# Patient Record
Sex: MALE | Race: WHITE | URBAN - NONMETROPOLITAN AREA
[De-identification: names, ages, dates, MRNs, and addresses within clinical notes are randomized per-mention and may not be internally consistent; named-entity substitution may affect disease eponyms.]

---

## 2023-08-01 ENCOUNTER — OFFICE VISIT (OUTPATIENT)
Dept: URBAN - NONMETROPOLITAN AREA CLINIC 19 | Facility: CLINIC | Age: 58
End: 2023-08-01

## 2023-08-01 DIAGNOSIS — H59.023 CATARACT FRAGMENT IN BILATERAL EYE FOLLOWING CATARACT SURGERY: Primary | ICD-10-CM

## 2023-08-01 DIAGNOSIS — H26.493 OTHER SECONDARY CATARACT, BILATERAL: ICD-10-CM

## 2023-08-01 PROCEDURE — 99202 OFFICE O/P NEW SF 15 MIN: CPT | Performed by: OPHTHALMOLOGY

## 2023-08-01 ASSESSMENT — VISUAL ACUITY
OS: 20/20
OD: 20/30

## 2023-08-01 ASSESSMENT — INTRAOCULAR PRESSURE
OD: 10
OS: 8

## 2023-08-16 ENCOUNTER — OFFICE VISIT (OUTPATIENT)
Dept: URBAN - METROPOLITAN AREA CLINIC 91 | Facility: CLINIC | Age: 58
End: 2023-08-16

## 2023-08-16 DIAGNOSIS — H26.492 OTHER SECONDARY CATARACT, LEFT EYE: ICD-10-CM

## 2023-08-16 DIAGNOSIS — H04.123 DRY EYE SYNDROME OF BILATERAL LACRIMAL GLANDS: Primary | ICD-10-CM

## 2023-08-16 DIAGNOSIS — H26.493 OTHER SECONDARY CATARACT, BILATERAL: ICD-10-CM

## 2023-08-16 PROCEDURE — 99213 OFFICE O/P EST LOW 20 MIN: CPT | Performed by: SPECIALIST

## 2023-08-16 PROCEDURE — 66821 AFTER CATARACT LASER SURGERY: CPT | Performed by: SPECIALIST

## 2023-08-16 ASSESSMENT — VISUAL ACUITY
OS: 20/30
OD: 20/20

## 2023-08-16 ASSESSMENT — INTRAOCULAR PRESSURE
OD: 9
OS: 7

## 2023-08-30 ENCOUNTER — OFFICE VISIT (OUTPATIENT)
Dept: URBAN - METROPOLITAN AREA CLINIC 91 | Facility: CLINIC | Age: 58
End: 2023-08-30

## 2023-08-30 DIAGNOSIS — H52.03 HYPERMETROPIA, BILATERAL: ICD-10-CM

## 2023-08-30 DIAGNOSIS — Z48.810 ENCOUNTER FOR SURGICAL AFTERCARE FOLLOWING SURGERY ON A SENSE ORGAN: Primary | ICD-10-CM

## 2023-08-30 PROCEDURE — 99024 POSTOP FOLLOW-UP VISIT: CPT | Performed by: SPECIALIST

## 2023-08-30 ASSESSMENT — VISUAL ACUITY
OD: 20/20
OS: 20/20

## 2023-08-30 ASSESSMENT — INTRAOCULAR PRESSURE
OS: 9
OD: 9

## 2024-05-22 ENCOUNTER — OFFICE VISIT (OUTPATIENT)
Dept: URBAN - METROPOLITAN AREA CLINIC 91 | Facility: CLINIC | Age: 59
End: 2024-05-22

## 2024-05-22 DIAGNOSIS — H04.123 DRY EYE SYNDROME OF BILATERAL LACRIMAL GLANDS: ICD-10-CM

## 2024-05-22 DIAGNOSIS — T85.22XA DISPLACEMENT OF INTRAOCULAR LENS, INITIAL ENCOUNTER: Primary | ICD-10-CM

## 2024-05-22 PROCEDURE — 99213 OFFICE O/P EST LOW 20 MIN: CPT | Performed by: SPECIALIST

## 2024-05-22 ASSESSMENT — INTRAOCULAR PRESSURE
OS: 10
OD: 9

## 2024-05-22 ASSESSMENT — VISUAL ACUITY
OD: 20/20
OS: 20/25

## 2024-07-18 ENCOUNTER — APPOINTMENT (RX ONLY)
Dept: URBAN - NONMETROPOLITAN AREA CLINIC 4 | Facility: CLINIC | Age: 59
Setting detail: DERMATOLOGY
End: 2024-07-18

## 2024-07-18 DIAGNOSIS — L72.8 OTHER FOLLICULAR CYSTS OF THE SKIN AND SUBCUTANEOUS TISSUE: ICD-10-CM

## 2024-07-18 DIAGNOSIS — L738 OTHER SPECIFIED DISEASES OF HAIR AND HAIR FOLLICLES: ICD-10-CM

## 2024-07-18 DIAGNOSIS — L82.0 INFLAMED SEBORRHEIC KERATOSIS: ICD-10-CM

## 2024-07-18 DIAGNOSIS — D485 NEOPLASM OF UNCERTAIN BEHAVIOR OF SKIN: ICD-10-CM

## 2024-07-18 DIAGNOSIS — D18.0 HEMANGIOMA: ICD-10-CM

## 2024-07-18 DIAGNOSIS — L663 OTHER SPECIFIED DISEASES OF HAIR AND HAIR FOLLICLES: ICD-10-CM

## 2024-07-18 DIAGNOSIS — L81.4 OTHER MELANIN HYPERPIGMENTATION: ICD-10-CM

## 2024-07-18 DIAGNOSIS — L73.9 FOLLICULAR DISORDER, UNSPECIFIED: ICD-10-CM

## 2024-07-18 PROBLEM — D48.5 NEOPLASM OF UNCERTAIN BEHAVIOR OF SKIN: Status: ACTIVE | Noted: 2024-07-18

## 2024-07-18 PROBLEM — D18.01 HEMANGIOMA OF SKIN AND SUBCUTANEOUS TISSUE: Status: ACTIVE | Noted: 2024-07-18

## 2024-07-18 PROBLEM — L02.222 FURUNCLE OF BACK [ANY PART, EXCEPT BUTTOCK]: Status: ACTIVE | Noted: 2024-07-18

## 2024-07-18 PROCEDURE — ? SHAVE REMOVAL

## 2024-07-18 PROCEDURE — 99203 OFFICE O/P NEW LOW 30 MIN: CPT | Mod: 25

## 2024-07-18 PROCEDURE — 11311 SHAVE SKIN LESION 0.6-1.0 CM: CPT

## 2024-07-18 PROCEDURE — ? CONSULTATION EXCISION

## 2024-07-18 PROCEDURE — ? COUNSELING

## 2024-07-18 ASSESSMENT — LOCATION SIMPLE DESCRIPTION DERM
LOCATION SIMPLE: UPPER BACK
LOCATION SIMPLE: SCALP
LOCATION SIMPLE: CHEST
LOCATION SIMPLE: LEFT CHEEK
LOCATION SIMPLE: LEFT FOREARM
LOCATION SIMPLE: LEFT UPPER BACK

## 2024-07-18 ASSESSMENT — LOCATION DETAILED DESCRIPTION DERM
LOCATION DETAILED: LEFT MID-UPPER BACK
LOCATION DETAILED: LEFT LATERAL INFERIOR CHEST
LOCATION DETAILED: LEFT CENTRAL FRONTAL SCALP
LOCATION DETAILED: SUPERIOR THORACIC SPINE
LOCATION DETAILED: LEFT PROXIMAL DORSAL FOREARM
LOCATION DETAILED: LEFT SUPERIOR PREAURICULAR CHEEK

## 2024-07-18 ASSESSMENT — LOCATION ZONE DERM
LOCATION ZONE: SCALP
LOCATION ZONE: FACE
LOCATION ZONE: ARM
LOCATION ZONE: TRUNK

## 2024-07-18 NOTE — PROCEDURE: CONSULTATION EXCISION
Anatomic Location From Referring Provider: right posterior temporal scalp
Detail Level: Detailed
X Size Of Lesion In Cm (Optional): 0

## 2024-07-18 NOTE — PROCEDURE: SHAVE REMOVAL
Medical Necessity Information: It is in your best interest to select a reason for this procedure from the list below. All of these items fulfill various CMS LCD requirements except the new and changing color options.
Medical Necessity Clause: This procedure was medically necessary because the lesion that was treated was:
Lab: -[id number]
Lab Facility: 0
Body Location Override (Optional - Billing Will Still Be Based On Selected Body Map Location If Applicable): left inferior preauricular/lateral jaw
Detail Level: Detailed
Was A Bandage Applied: Yes
Size Of Lesion In Cm (Required): 0.8
Depth Of Shave: dermis
Biopsy Method: Dermablade
Anesthesia Type: 1% lidocaine with epinephrine
Hemostasis: Electrocautery
Wound Care: Petrolatum
Path Notes (To The Dermatopathologist): Size: 0.8 cm R/O: DN vs MM
Render Path Notes In Note?: No
Consent was obtained from the patient. The risks and benefits to therapy were discussed in detail. Specifically, the risks of infection, scarring, bleeding, prolonged wound healing, incomplete removal, allergy to anesthesia, nerve injury and recurrence were addressed. Prior to the procedure, the treatment site was clearly identified and confirmed by the patient. All components of Universal Protocol/PAUSE Rule completed.
Post-Care Instructions: I reviewed with the patient in detail post-care instructions. Patient is to keep the biopsy site dry overnight, and then apply bacitracin twice daily until healed. Patient may apply hydrogen peroxide soaks to remove any crusting.
Notification Instructions: Patient will be notified of pathology results. However, patient instructed to call the office if not contacted within 2 weeks.
Billing Type: Third-Party Bill

## 2024-08-01 ENCOUNTER — APPOINTMENT (RX ONLY)
Dept: URBAN - NONMETROPOLITAN AREA CLINIC 4 | Facility: CLINIC | Age: 59
Setting detail: DERMATOLOGY
End: 2024-08-01

## 2024-08-01 PROBLEM — D03.39 MELANOMA IN SITU OF OTHER PARTS OF FACE: Status: ACTIVE | Noted: 2024-08-01

## 2024-08-01 PROCEDURE — 14041 TIS TRNFR F/C/C/M/N/A/G/H/F: CPT

## 2024-08-01 PROCEDURE — ? PATHOLOGY DISCUSSION

## 2024-08-01 PROCEDURE — 14040 TIS TRNFR F/C/C/M/N/A/G/H/F: CPT | Mod: 59

## 2024-08-01 PROCEDURE — ? COUNSELING

## 2024-08-01 PROCEDURE — ? EXCISION

## 2024-08-01 PROCEDURE — ? CONSULTATION EXCISION

## 2024-08-15 ENCOUNTER — APPOINTMENT (RX ONLY)
Dept: URBAN - NONMETROPOLITAN AREA CLINIC 4 | Facility: CLINIC | Age: 59
Setting detail: DERMATOLOGY
End: 2024-08-15

## 2024-08-15 DIAGNOSIS — D485 NEOPLASM OF UNCERTAIN BEHAVIOR OF SKIN: ICD-10-CM

## 2024-08-15 DIAGNOSIS — L08.9 LOCAL INFECTION OF THE SKIN AND SUBCUTANEOUS TISSUE, UNSPECIFIED: ICD-10-CM

## 2024-08-15 DIAGNOSIS — Z48.02 ENCOUNTER FOR REMOVAL OF SUTURES: ICD-10-CM

## 2024-08-15 PROBLEM — D48.5 NEOPLASM OF UNCERTAIN BEHAVIOR OF SKIN: Status: ACTIVE | Noted: 2024-08-15

## 2024-08-15 PROCEDURE — 99212 OFFICE O/P EST SF 10 MIN: CPT | Mod: 24,25

## 2024-08-15 PROCEDURE — ? TREATMENT REGIMEN

## 2024-08-15 PROCEDURE — ? PRESCRIPTION

## 2024-08-15 PROCEDURE — 13121 CMPLX RPR S/A/L 2.6-7.5 CM: CPT | Mod: 79

## 2024-08-15 PROCEDURE — ? EXCISION

## 2024-08-15 PROCEDURE — 11426 EXC H-F-NK-SP B9+MARG >4 CM: CPT | Mod: 79

## 2024-08-15 PROCEDURE — ? SUTURE REMOVAL (GLOBAL PERIOD)

## 2024-08-15 PROCEDURE — ? COUNSELING

## 2024-08-15 RX ORDER — CEPHALEXIN 500 MG/1
CAPSULE ORAL
Qty: 30 | Refills: 0 | Status: ERX | COMMUNITY
Start: 2024-08-15

## 2024-08-15 RX ADMIN — CEPHALEXIN: 500 CAPSULE ORAL at 00:00

## 2024-08-15 ASSESSMENT — LOCATION SIMPLE DESCRIPTION DERM
LOCATION SIMPLE: LEFT CHEEK
LOCATION SIMPLE: POSTERIOR SCALP

## 2024-08-15 ASSESSMENT — LOCATION DETAILED DESCRIPTION DERM
LOCATION DETAILED: RIGHT OCCIPITAL SCALP
LOCATION DETAILED: LEFT SUPERIOR PREAURICULAR CHEEK

## 2024-08-15 ASSESSMENT — LOCATION ZONE DERM
LOCATION ZONE: FACE
LOCATION ZONE: SCALP

## 2024-08-21 ENCOUNTER — APPOINTMENT (OUTPATIENT)
Dept: ADMISSIONS | Facility: MEDICAL CENTER | Age: 59
End: 2024-08-21
Attending: OPHTHALMOLOGY
Payer: COMMERCIAL

## 2024-08-22 ENCOUNTER — PRE-ADMISSION TESTING (OUTPATIENT)
Dept: ADMISSIONS | Facility: MEDICAL CENTER | Age: 59
End: 2024-08-22
Attending: OPHTHALMOLOGY
Payer: COMMERCIAL

## 2024-08-22 NOTE — OR NURSING
During patient's pre-admit telephone appointment today pt. stated he [place] and is unable to have testing done prior to DOS on 8/30/2024 with MD Long. Informed MD Long office of the above, spoke with his surgery scheduler Zayda who states she will inform MD Long.

## 2024-08-22 NOTE — OR NURSING
[facility] [place]  's office aware.  Orders faxed.  [facility] called to let us know that the print on the orders is too light and they cannot read the orders.  @[phone] Call made to Dr. Long's office to notify.  Spoke with Hope, the surgery scheduler, asked her to fax original orders to [facility] and provided her with the fax number.  She reports it is okay to complete EKG day of surgery.

## 2024-08-27 NOTE — OR NURSING
Patient was to complete preop testing at [facility] in [place]. As of today, no results.  @[phone] Call made to patient to inquire.  No answer and mailbox full so I was unable to leave a message.

## 2024-08-29 ENCOUNTER — APPOINTMENT (RX ONLY)
Dept: URBAN - NONMETROPOLITAN AREA CLINIC 4 | Facility: CLINIC | Age: 59
Setting detail: DERMATOLOGY
End: 2024-08-29

## 2024-08-29 DIAGNOSIS — Z48.02 ENCOUNTER FOR REMOVAL OF SUTURES: ICD-10-CM

## 2024-08-29 PROCEDURE — ? SUTURE REMOVAL (GLOBAL PERIOD)

## 2024-08-29 PROCEDURE — ? PATHOLOGY DISCUSSION

## 2024-08-29 PROCEDURE — ? COUNSELING

## 2024-08-29 ASSESSMENT — LOCATION ZONE DERM: LOCATION ZONE: SCALP

## 2024-08-29 ASSESSMENT — LOCATION SIMPLE DESCRIPTION DERM: LOCATION SIMPLE: POSTERIOR SCALP

## 2024-08-29 ASSESSMENT — LOCATION DETAILED DESCRIPTION DERM: LOCATION DETAILED: RIGHT POSTAURICULAR SKIN

## 2024-08-30 ENCOUNTER — HOSPITAL ENCOUNTER (OUTPATIENT)
Facility: MEDICAL CENTER | Age: 59
End: 2024-08-30
Attending: OPHTHALMOLOGY | Admitting: OPHTHALMOLOGY
Payer: COMMERCIAL

## 2024-08-30 ENCOUNTER — ANESTHESIA (OUTPATIENT)
Dept: SURGERY | Facility: MEDICAL CENTER | Age: 59
End: 2024-08-30
Payer: COMMERCIAL

## 2024-08-30 ENCOUNTER — ANESTHESIA EVENT (OUTPATIENT)
Dept: SURGERY | Facility: MEDICAL CENTER | Age: 59
End: 2024-08-30
Payer: COMMERCIAL

## 2024-08-30 VITALS
DIASTOLIC BLOOD PRESSURE: 65 MMHG | OXYGEN SATURATION: 93 % | HEART RATE: 76 BPM | SYSTOLIC BLOOD PRESSURE: 116 MMHG | TEMPERATURE: 98.5 F | BODY MASS INDEX: 40.43 KG/M2 | WEIGHT: 315 LBS | HEIGHT: 74 IN | RESPIRATION RATE: 17 BRPM

## 2024-08-30 LAB
EKG IMPRESSION: NORMAL
ERYTHROCYTE [DISTWIDTH] IN BLOOD BY AUTOMATED COUNT: 40.7 FL (ref 35.9–50)
HCT VFR BLD AUTO: 48.6 % (ref 42–52)
HGB BLD-MCNC: 16.4 G/DL (ref 14–18)
MCH RBC QN AUTO: 29.4 PG (ref 27–33)
MCHC RBC AUTO-ENTMCNC: 33.7 G/DL (ref 32.3–36.5)
MCV RBC AUTO: 87.1 FL (ref 81.4–97.8)
PLATELET # BLD AUTO: 151 K/UL (ref 164–446)
PMV BLD AUTO: 10.6 FL (ref 9–12.9)
RBC # BLD AUTO: 5.58 M/UL (ref 4.7–6.1)
WBC # BLD AUTO: 6.4 K/UL (ref 4.8–10.8)

## 2024-08-30 PROCEDURE — 160041 HCHG SURGERY MINUTES - EA ADDL 1 MIN LEVEL 4: Performed by: OPHTHALMOLOGY

## 2024-08-30 PROCEDURE — 700101 HCHG RX REV CODE 250: Performed by: ANESTHESIOLOGY

## 2024-08-30 PROCEDURE — 700101 HCHG RX REV CODE 250

## 2024-08-30 PROCEDURE — 700101 HCHG RX REV CODE 250: Performed by: OPHTHALMOLOGY

## 2024-08-30 PROCEDURE — 700111 HCHG RX REV CODE 636 W/ 250 OVERRIDE (IP): Performed by: OPHTHALMOLOGY

## 2024-08-30 PROCEDURE — 160046 HCHG PACU - 1ST 60 MINS PHASE II: Performed by: OPHTHALMOLOGY

## 2024-08-30 PROCEDURE — 93005 ELECTROCARDIOGRAM TRACING: CPT | Performed by: OPHTHALMOLOGY

## 2024-08-30 PROCEDURE — 36415 COLL VENOUS BLD VENIPUNCTURE: CPT

## 2024-08-30 PROCEDURE — 160025 RECOVERY II MINUTES (STATS): Performed by: OPHTHALMOLOGY

## 2024-08-30 PROCEDURE — 700111 HCHG RX REV CODE 636 W/ 250 OVERRIDE (IP): Performed by: ANESTHESIOLOGY

## 2024-08-30 PROCEDURE — 160002 HCHG RECOVERY MINUTES (STAT): Performed by: OPHTHALMOLOGY

## 2024-08-30 PROCEDURE — 160029 HCHG SURGERY MINUTES - 1ST 30 MINS LEVEL 4: Performed by: OPHTHALMOLOGY

## 2024-08-30 PROCEDURE — 85027 COMPLETE CBC AUTOMATED: CPT

## 2024-08-30 PROCEDURE — 160048 HCHG OR STATISTICAL LEVEL 1-5: Performed by: OPHTHALMOLOGY

## 2024-08-30 PROCEDURE — 93010 ELECTROCARDIOGRAM REPORT: CPT | Performed by: INTERNAL MEDICINE

## 2024-08-30 PROCEDURE — 700105 HCHG RX REV CODE 258: Performed by: OPHTHALMOLOGY

## 2024-08-30 PROCEDURE — 160009 HCHG ANES TIME/MIN: Performed by: OPHTHALMOLOGY

## 2024-08-30 PROCEDURE — 160035 HCHG PACU - 1ST 60 MINS PHASE I: Performed by: OPHTHALMOLOGY

## 2024-08-30 RX ORDER — DEXAMETHASONE SODIUM PHOSPHATE 4 MG/ML
INJECTION, SOLUTION INTRA-ARTICULAR; INTRALESIONAL; INTRAMUSCULAR; INTRAVENOUS; SOFT TISSUE
Status: DISCONTINUED | OUTPATIENT
Start: 2024-08-30 | End: 2024-08-30 | Stop reason: HOSPADM

## 2024-08-30 RX ORDER — BALANCED SALT SOLUTION ENRICHED WITH BICARBONATE, DEXTROSE, AND GLUTATHIONE
KIT INTRAOCULAR
Status: DISCONTINUED
Start: 2024-08-30 | End: 2024-08-30 | Stop reason: HOSPADM

## 2024-08-30 RX ORDER — HYDROMORPHONE HYDROCHLORIDE 1 MG/ML
0.2 INJECTION, SOLUTION INTRAMUSCULAR; INTRAVENOUS; SUBCUTANEOUS
Status: DISCONTINUED | OUTPATIENT
Start: 2024-08-30 | End: 2024-08-30 | Stop reason: HOSPADM

## 2024-08-30 RX ORDER — EPHEDRINE SULFATE 50 MG/ML
5 INJECTION, SOLUTION INTRAVENOUS
Status: DISCONTINUED | OUTPATIENT
Start: 2024-08-30 | End: 2024-08-30 | Stop reason: HOSPADM

## 2024-08-30 RX ORDER — KETOROLAC TROMETHAMINE 15 MG/ML
INJECTION, SOLUTION INTRAMUSCULAR; INTRAVENOUS PRN
Status: DISCONTINUED | OUTPATIENT
Start: 2024-08-30 | End: 2024-08-30 | Stop reason: SURG

## 2024-08-30 RX ORDER — CYCLOPENTOLATE HYDROCHLORIDE 10 MG/ML
SOLUTION/ DROPS OPHTHALMIC
Status: COMPLETED
Start: 2024-08-30 | End: 2024-08-30

## 2024-08-30 RX ORDER — MEPERIDINE HYDROCHLORIDE 25 MG/ML
6.25 INJECTION INTRAMUSCULAR; INTRAVENOUS; SUBCUTANEOUS
Status: DISCONTINUED | OUTPATIENT
Start: 2024-08-30 | End: 2024-08-30 | Stop reason: HOSPADM

## 2024-08-30 RX ORDER — TRIAMCINOLONE ACETONIDE 40 MG/ML
INJECTION, SUSPENSION INTRA-ARTICULAR; INTRAMUSCULAR
Status: DISCONTINUED | OUTPATIENT
Start: 2024-08-30 | End: 2024-08-30 | Stop reason: HOSPADM

## 2024-08-30 RX ORDER — OXYCODONE HCL 5 MG/5 ML
10 SOLUTION, ORAL ORAL
Status: DISCONTINUED | OUTPATIENT
Start: 2024-08-30 | End: 2024-08-30 | Stop reason: HOSPADM

## 2024-08-30 RX ORDER — NEOMYCIN SULFATE, POLYMYXIN B SULFATE, AND DEXAMETHASONE 3.5; 10000; 1 MG/G; [USP'U]/G; MG/G
OINTMENT OPHTHALMIC
Status: DISCONTINUED | OUTPATIENT
Start: 2024-08-30 | End: 2024-08-30 | Stop reason: HOSPADM

## 2024-08-30 RX ORDER — PHENYLEPHRINE HCL IN 0.9% NACL 1 MG/10 ML
SYRINGE (ML) INTRAVENOUS PRN
Status: DISCONTINUED | OUTPATIENT
Start: 2024-08-30 | End: 2024-08-30 | Stop reason: SURG

## 2024-08-30 RX ORDER — SODIUM CHLORIDE, SODIUM LACTATE, POTASSIUM CHLORIDE, CALCIUM CHLORIDE 600; 310; 30; 20 MG/100ML; MG/100ML; MG/100ML; MG/100ML
INJECTION, SOLUTION INTRAVENOUS CONTINUOUS
Status: DISCONTINUED | OUTPATIENT
Start: 2024-08-30 | End: 2024-08-30 | Stop reason: HOSPADM

## 2024-08-30 RX ORDER — LIDOCAINE HYDROCHLORIDE 20 MG/ML
INJECTION, SOLUTION EPIDURAL; INFILTRATION; INTRACAUDAL; PERINEURAL PRN
Status: DISCONTINUED | OUTPATIENT
Start: 2024-08-30 | End: 2024-08-30 | Stop reason: SURG

## 2024-08-30 RX ORDER — HYDROMORPHONE HYDROCHLORIDE 1 MG/ML
0.1 INJECTION, SOLUTION INTRAMUSCULAR; INTRAVENOUS; SUBCUTANEOUS
Status: DISCONTINUED | OUTPATIENT
Start: 2024-08-30 | End: 2024-08-30 | Stop reason: HOSPADM

## 2024-08-30 RX ORDER — CEFAZOLIN SODIUM 1 G/3ML
INJECTION, POWDER, FOR SOLUTION INTRAMUSCULAR; INTRAVENOUS
Status: DISCONTINUED | OUTPATIENT
Start: 2024-08-30 | End: 2024-08-30 | Stop reason: HOSPADM

## 2024-08-30 RX ORDER — ONDANSETRON 2 MG/ML
INJECTION INTRAMUSCULAR; INTRAVENOUS PRN
Status: DISCONTINUED | OUTPATIENT
Start: 2024-08-30 | End: 2024-08-30 | Stop reason: SURG

## 2024-08-30 RX ORDER — TIMOLOL MALEATE 5 MG/ML
SOLUTION/ DROPS OPHTHALMIC
Status: DISCONTINUED | OUTPATIENT
Start: 2024-08-30 | End: 2024-08-30 | Stop reason: HOSPADM

## 2024-08-30 RX ORDER — DIPHENHYDRAMINE HYDROCHLORIDE 50 MG/ML
12.5 INJECTION INTRAMUSCULAR; INTRAVENOUS
Status: DISCONTINUED | OUTPATIENT
Start: 2024-08-30 | End: 2024-08-30 | Stop reason: HOSPADM

## 2024-08-30 RX ORDER — BALANCED SALT SOLUTION ENRICHED WITH BICARBONATE, DEXTROSE, AND GLUTATHIONE
KIT INTRAOCULAR
Status: DISCONTINUED | OUTPATIENT
Start: 2024-08-30 | End: 2024-08-30 | Stop reason: HOSPADM

## 2024-08-30 RX ORDER — SODIUM CHLORIDE, SODIUM LACTATE, POTASSIUM CHLORIDE, CALCIUM CHLORIDE 600; 310; 30; 20 MG/100ML; MG/100ML; MG/100ML; MG/100ML
INJECTION, SOLUTION INTRAVENOUS CONTINUOUS
Status: DISCONTINUED | OUTPATIENT
Start: 2024-08-30 | End: 2024-08-30

## 2024-08-30 RX ORDER — BALANCED SALT SOLUTION 6.4; .75; .48; .3; 3.9; 1.7 MG/ML; MG/ML; MG/ML; MG/ML; MG/ML; MG/ML
SOLUTION OPHTHALMIC
Status: DISCONTINUED | OUTPATIENT
Start: 2024-08-30 | End: 2024-08-30 | Stop reason: HOSPADM

## 2024-08-30 RX ORDER — OXYCODONE HCL 5 MG/5 ML
5 SOLUTION, ORAL ORAL
Status: DISCONTINUED | OUTPATIENT
Start: 2024-08-30 | End: 2024-08-30 | Stop reason: HOSPADM

## 2024-08-30 RX ORDER — PHENYLEPHRINE HYDROCHLORIDE 25 MG/ML
SOLUTION/ DROPS OPHTHALMIC
Status: COMPLETED
Start: 2024-08-30 | End: 2024-08-30

## 2024-08-30 RX ORDER — HYDRALAZINE HYDROCHLORIDE 20 MG/ML
5 INJECTION INTRAMUSCULAR; INTRAVENOUS
Status: DISCONTINUED | OUTPATIENT
Start: 2024-08-30 | End: 2024-08-30 | Stop reason: HOSPADM

## 2024-08-30 RX ORDER — ONDANSETRON 2 MG/ML
4 INJECTION INTRAMUSCULAR; INTRAVENOUS
Status: DISCONTINUED | OUTPATIENT
Start: 2024-08-30 | End: 2024-08-30 | Stop reason: HOSPADM

## 2024-08-30 RX ORDER — LABETALOL HYDROCHLORIDE 5 MG/ML
5 INJECTION, SOLUTION INTRAVENOUS
Status: DISCONTINUED | OUTPATIENT
Start: 2024-08-30 | End: 2024-08-30 | Stop reason: HOSPADM

## 2024-08-30 RX ORDER — HYDROMORPHONE HYDROCHLORIDE 1 MG/ML
0.4 INJECTION, SOLUTION INTRAMUSCULAR; INTRAVENOUS; SUBCUTANEOUS
Status: DISCONTINUED | OUTPATIENT
Start: 2024-08-30 | End: 2024-08-30 | Stop reason: HOSPADM

## 2024-08-30 RX ORDER — HALOPERIDOL 5 MG/ML
1 INJECTION INTRAMUSCULAR
Status: DISCONTINUED | OUTPATIENT
Start: 2024-08-30 | End: 2024-08-30 | Stop reason: HOSPADM

## 2024-08-30 RX ORDER — ATROPINE SULFATE 10 MG/ML
SOLUTION/ DROPS OPHTHALMIC
Status: DISCONTINUED | OUTPATIENT
Start: 2024-08-30 | End: 2024-08-30 | Stop reason: HOSPADM

## 2024-08-30 RX ORDER — DEXAMETHASONE SODIUM PHOSPHATE 4 MG/ML
INJECTION, SOLUTION INTRA-ARTICULAR; INTRALESIONAL; INTRAMUSCULAR; INTRAVENOUS; SOFT TISSUE PRN
Status: DISCONTINUED | OUTPATIENT
Start: 2024-08-30 | End: 2024-08-30 | Stop reason: SURG

## 2024-08-30 RX ADMIN — CYCLOPENTOLATE HYDROCHLORIDE 3 DROP: 10 SOLUTION OPHTHALMIC at 09:40

## 2024-08-30 RX ADMIN — LIDOCAINE HYDROCHLORIDE 100 MG: 20 INJECTION, SOLUTION EPIDURAL; INFILTRATION; INTRACAUDAL at 10:20

## 2024-08-30 RX ADMIN — SODIUM CHLORIDE, POTASSIUM CHLORIDE, SODIUM LACTATE AND CALCIUM CHLORIDE: 600; 310; 30; 20 INJECTION, SOLUTION INTRAVENOUS at 10:10

## 2024-08-30 RX ADMIN — Medication 200 MCG: at 11:03

## 2024-08-30 RX ADMIN — PROPOFOL 250 MG: 10 INJECTION, EMULSION INTRAVENOUS at 10:20

## 2024-08-30 RX ADMIN — Medication 200 MCG: at 11:30

## 2024-08-30 RX ADMIN — KETOROLAC TROMETHAMINE 15 MG: 15 INJECTION, SOLUTION INTRAMUSCULAR; INTRAVENOUS at 11:53

## 2024-08-30 RX ADMIN — FENTANYL CITRATE 100 MCG: 50 INJECTION, SOLUTION INTRAMUSCULAR; INTRAVENOUS at 10:19

## 2024-08-30 RX ADMIN — Medication 200 MCG: at 10:34

## 2024-08-30 RX ADMIN — ONDANSETRON 4 MG: 2 INJECTION INTRAMUSCULAR; INTRAVENOUS at 11:53

## 2024-08-30 RX ADMIN — DEXAMETHASONE SODIUM PHOSPHATE 4 MG: 4 INJECTION INTRA-ARTICULAR; INTRALESIONAL; INTRAMUSCULAR; INTRAVENOUS; SOFT TISSUE at 10:35

## 2024-08-30 RX ADMIN — PHENYLEPHRINE HYDROCHLORIDE 3 DROP: 25 SOLUTION/ DROPS OPHTHALMIC at 09:40

## 2024-08-30 RX ADMIN — FENTANYL CITRATE 50 MCG: 50 INJECTION, SOLUTION INTRAMUSCULAR; INTRAVENOUS at 11:53

## 2024-08-30 ASSESSMENT — PAIN DESCRIPTION - PAIN TYPE
TYPE: SURGICAL PAIN

## 2024-08-30 ASSESSMENT — PAIN SCALES - GENERAL: PAIN_LEVEL: 2

## 2024-08-30 NOTE — DISCHARGE INSTRUCTIONS
What to Expect Post Anesthesia    Rest and take it easy for the first 24 hours.  A responsible adult is recommended to remain with you during that time.  It is normal to feel sleepy.  We encourage you to not do anything that requires balance, judgment or coordination.    FOR 24 HOURS DO NOT:  Drive, operate machinery or run household appliances.  Drink beer or alcoholic beverages.  Make important decisions or sign legal documents.    To avoid nausea, slowly advance diet as tolerated, avoiding spicy or greasy foods for the first day.  Add more substantial food to your diet according to your provider's instructions.  Babies can be fed formula or breast milk as soon as they are hungry.  INCREASE FLUIDS AND FIBER TO AVOID CONSTIPATION.    MILD FLU-LIKE SYMPTOMS ARE NORMAL.  YOU MAY EXPERIENCE GENERALIZED MUSCLE ACHES, THROAT IRRITATION, HEADACHE AND/OR SOME NAUSEA.    Last pain medication given:  Toradol (like ibuprofen/motrin) at 11:53am, can take another dose of ibuprofen at 5:53pm

## 2024-08-30 NOTE — ANESTHESIA POSTPROCEDURE EVALUATION
Patient: Hugh Hickey    Procedure Summary       Date: 08/30/24 Room / Location: [facility] ROOM 24 / SURGERY SAME DAY [facility]    Anesthesia Start: 1015 Anesthesia Stop: 1226    Procedure: LEFT EYE POSTERIOR VITRECTOMY WITH REMOVAL OF DISLOCATED INTRAOCULAR LENS (Left: Eye) Diagnosis: (NEW DISLOCATED LENS OS)    Surgeons: Jose Miguel Long M.D. Responsible Provider: Chandrakant Leung M.D.    Anesthesia Type: general ASA Status: 3            Final Anesthesia Type: general  Last vitals  BP   Blood Pressure: 116/65    Temp   36.9 °C (98.5 °F)    Pulse   76   Resp   17    SpO2   93 %      Anesthesia Post Evaluation    Patient location during evaluation: PACU  Patient participation: complete - patient participated  Level of consciousness: awake and alert  Pain score: 2    Airway patency: patent  Anesthetic complications: no  Cardiovascular status: hemodynamically stable  Respiratory status: acceptable  Hydration status: euvolemic    PONV: none          There were no known notable events for this encounter.     Nurse Pain Score: 2 (NPRS)

## 2024-08-30 NOTE — OR NURSING
1223 - Pt to PACU 7 from OR.  Bedside report from anesthesiologist and RN.  Attached to monitoring, VSS, breathing is calm and unlabored on 6L oxymask with eye patch and dressing to L eye.     1230- Pt awake, no reports of pain or nausea. Tolerating sips of water, VSS on RA. Called and updated pt's wife.     1257- Pt's wife brought back to bedside. Reviewed discharge instructions with pt and pt's wife. All instructions acknowledged, all questions answered.     1325- Pt feeling ready for discharge, wife assisting him to get dressed.     1335- IV dc'd, pt escorted off the unit in wheelchair by CCT, VSS on RA, all belongings sent with pt's wife.

## 2024-08-30 NOTE — ANESTHESIA TIME REPORT
Anesthesia Start and Stop Event Times       Date Time Event    8/30/2024 1000 Ready for Procedure     1015 Anesthesia Start     1226 Anesthesia Stop          Responsible Staff  08/30/24      Name Role Begin End    Chandrakant Leung M.D. Anesth 1015 1226          Overtime Reason:  no overtime (within assigned shift)    Comments:

## 2024-08-30 NOTE — ANESTHESIA PROCEDURE NOTES
Airway    Date/Time: 8/30/2024 10:20 AM    Performed by: Chandrakant Leung M.D.  Authorized by: Chandrakant Leung M.D.    Location:  OR  Urgency:  Elective  Indications for Airway Management:  Anesthesia      Spontaneous Ventilation: absent    Sedation Level:  Deep  Preoxygenated: Yes    Mask Difficulty Assessment:  0 - not attempted  Final Airway Type:  Supraglottic airway  Final Supraglottic Airway:  Standard LMA    SGA Size:  5  Number of Attempts at Approach:  1  Number of Other Approaches Attempted:  0

## 2024-08-30 NOTE — ANESTHESIA PREPROCEDURE EVALUATION
Case: [id number] Anesthesia Start Date/Time: 08/30/24 1015    Procedures:       LEFT EYE POSTERIOR VITRECTOMY WITH REMOVAL POSSIBLE OF DISLOCATED INTRAOCULAR LENS IRIS FIXATION OF DISLOCATED INTRAOCULAR AND POSSIBLE REMOVAL INTRAOCULAR LENS 25 GAUGE AND ANY OTHER INDICATED PROCEDURES      EXTRACTION, CATARACT, WITH IOL INSERTION    Pre-op diagnosis: NEW DISLOCATED LENS OS    Location: [facility] ROOM 24 / SURGERY SAME DAY [facility]    Surgeons: Jose Miguel Long M.D.            Obesity, hiatal hernia.     Relevant Problems   No relevant active problems       Physical Exam    Airway   Mallampati: II  TM distance: >3 FB  Neck ROM: full       Cardiovascular - normal exam  Rhythm: regular  Rate: normal  (-) murmur     Dental - normal exam           Pulmonary - normal exam  Breath sounds clear to auscultation     Abdominal    Neurological - normal exam                   Anesthesia Plan    ASA 3   ASA physical status 3 criteria: morbid obesity - BMI greater than or equal to 40    Plan - general       Airway plan will be LMA          Induction: intravenous    Postoperative Plan: Postoperative administration of opioids is intended.    Pertinent diagnostic labs and testing reviewed    Informed Consent:    Anesthetic plan and risks discussed with patient.    Use of blood products discussed with: patient whom consented to blood products.

## 2024-08-31 NOTE — OP REPORT
DATE OF SERVICE:  08/30/2024     PREOPERATIVE DIAGNOSES:  Posteriorly dislocated lens implant, left eye and   floater, left eye.     POSTOPERATIVE DIAGNOSIS:  Posteriorly dislocated lens implant, left eye and   floater, left eye.     SURGEON:  Jose Miguel Long MD     ASSISTANT:  None.     ANESTHESIA:  General.     BLOOD LOSS:  None.     SPECIMENS REMOVED:  None.     IMPLANT:  Intraocular lens implant.     INDICATIONS:  This patient presented with the findings noted above.  We   discussed in detail the risks, benefits and alternatives regarding surgery.    Risks include but not limited to the following:  High eye pressure, low eye   pressure, bleeding, infection, retinal tear, retinal detachment, need for   further surgery, pain-induced refractive error, complete and irreversible loss   of all vision, loss of the eye.  The patient understood there were no   structural or visual guarantees.  He had a chance to have all of his questions   answered.  He consented to the procedure.     PROCEDURE IN DETAIL:  The patient was prepped and draped in the usual sterile   manner.  Attention directed toward the left eye.  The 25 gauge trocars were   placed 3.5 mm posterior to the limbus at 9:30, 2:30, and 3:30 o'clock.  The   infusion cannula was placed in the inferotemporal sclerotomy.  We then   proceeded with posterior vitrectomy.  We then brought the lens implant   anterior to the iris using 25 gauge Maxi  forceps.  This was a somewhat   complex maneuver, but we were able to succeed and bring it anterior to the   iris after several attempts.  We did note in the beginning of the case, the   patient did have a fair amount of transillumination defects and once the lens   was brought to the anterior chamber, it was actually being buffeted about   quite a bit from the intraocular recurrence from the infusion cannula even   though we had dropped infusion down to 5.  I should note that also it is part   of the procedure,  bringing the lens into the anterior chamber, we did make 2   paracentesis with a Superblade at the supratemporal supranasal quadrants.    Also at this point, the pupil on its own come down to about 3 mm in diameter.    Thus, we did not need to use Miochol at this point.  We then attempted to   place the haptics posterior to the iris while keeping the optic captured   anterior to the iris.  However, the lens did actually slip back behind the   iris in its entirety and fell to the back of the retina again.  We picked it   up with forceps and then in a similar maneuver too before, we tried to bring   it anterior to the iris.  However, in the middle of the hand shake maneuver,   we switched from grasping the haptic from the right hand to the left hand, the   haptic, which was already seen to be quite fragile, did in fact break, so we   then brought the lens anterior to the iris and we took the broken haptic.  The   portion that was still attached to the optic and brought it through a   paracentesis to keep the lens anterior.  We then tried to use a variety of   scissors to cut the optic that were unsuccessful.  Thus, we made an   approximately 5.5 mm shell scleral incision with a keratome and the lens was   delivered through this port.  I should note that during the case, we did use a   generous amounts of Viscoat to protect the cornea.  We then looked   peripherally with the light pipe to make sure there were no peripheral breaks,   none were seen.  I should note that the other broken piece of the haptic was   also removed from the eye.  Now in closing the superior incision, we did note   the reopening of the previous cataract incision on the temporal side and this   also had to be closed.  The 10-0 nylon sutures were used to close these   incision as well as the incision that we just made.  We used fluorescein and   Weck-Cels to make sure there were no leaks from the incisions and similarly   rehydrated the  paracentesis sites.  There were at 4, 8, 2, and 10 and made   sure there were no leaks from those. I should also note that we did use iris   retractors and we had to retrieve the lens from the retina; the second,   however, it was achieved, would come down.  With the use of iris retractors,   it was removed from the eye, was approximately 20 by digital estimation.    Subconjunctival Kefzol and Decadron were injected.  The patient tolerated the   procedure well and was taken to recovery room in good and stable condition.        ______________________________  MD MAGDY Scales/CORAL/ELIA    DD:  08/31/2024 11:04  DT:  08/31/2024 11:42    Job#:  [id number]

## 2024-09-20 ENCOUNTER — DOCUMENTATION (OUTPATIENT)
Dept: HEALTH INFORMATION MANAGEMENT | Facility: OTHER | Age: 59
End: 2024-09-20
Payer: COMMERCIAL

## 2025-07-21 ENCOUNTER — APPOINTMENT (OUTPATIENT)
Dept: RADIOLOGY | Facility: MEDICAL CENTER | Age: 60
End: 2025-07-21
Attending: INTERNAL MEDICINE
Payer: COMMERCIAL

## 2025-07-21 ENCOUNTER — HOSPITAL ENCOUNTER (OUTPATIENT)
Facility: MEDICAL CENTER | Age: 60
End: 2025-07-22
Attending: EMERGENCY MEDICINE | Admitting: INTERNAL MEDICINE
Payer: COMMERCIAL

## 2025-07-21 DIAGNOSIS — I16.0 HYPERTENSIVE URGENCY: ICD-10-CM

## 2025-07-21 DIAGNOSIS — G93.2 IDIOPATHIC INTRACRANIAL HYPERTENSION: ICD-10-CM

## 2025-07-21 DIAGNOSIS — H47.10 PAPILLEDEMA OF LEFT EYE: Primary | ICD-10-CM

## 2025-07-21 PROBLEM — E66.9 OBESITY: Status: ACTIVE | Noted: 2025-07-21

## 2025-07-21 LAB
ALBUMIN SERPL BCP-MCNC: 4.3 G/DL (ref 3.2–4.9)
ALBUMIN/GLOB SERPL: 1.7 G/DL
ALP SERPL-CCNC: 77 U/L (ref 30–99)
ALT SERPL-CCNC: 27 U/L (ref 2–50)
ANION GAP SERPL CALC-SCNC: 10 MMOL/L (ref 7–16)
AST SERPL-CCNC: 23 U/L (ref 12–45)
BASOPHILS # BLD AUTO: 0.3 % (ref 0–1.8)
BASOPHILS # BLD: 0.02 K/UL (ref 0–0.12)
BILIRUB SERPL-MCNC: 0.4 MG/DL (ref 0.1–1.5)
BUN SERPL-MCNC: 19 MG/DL (ref 8–22)
CALCIUM ALBUM COR SERPL-MCNC: 9 MG/DL (ref 8.5–10.5)
CALCIUM SERPL-MCNC: 9.2 MG/DL (ref 8.5–10.5)
CHLORIDE SERPL-SCNC: 108 MMOL/L (ref 96–112)
CO2 SERPL-SCNC: 25 MMOL/L (ref 20–33)
CREAT SERPL-MCNC: 1.03 MG/DL (ref 0.5–1.4)
EOSINOPHIL # BLD AUTO: 0.26 K/UL (ref 0–0.51)
EOSINOPHIL NFR BLD: 3.8 % (ref 0–6.9)
ERYTHROCYTE [DISTWIDTH] IN BLOOD BY AUTOMATED COUNT: 39.1 FL (ref 35.9–50)
GFR SERPLBLD CREATININE-BSD FMLA CKD-EPI: 83 ML/MIN/1.73 M 2
GLOBULIN SER CALC-MCNC: 2.5 G/DL (ref 1.9–3.5)
GLUCOSE SERPL-MCNC: 104 MG/DL (ref 65–99)
HCT VFR BLD AUTO: 46.8 % (ref 42–52)
HGB BLD-MCNC: 16 G/DL (ref 14–18)
IMM GRANULOCYTES # BLD AUTO: 0.04 K/UL (ref 0–0.11)
IMM GRANULOCYTES NFR BLD AUTO: 0.6 % (ref 0–0.9)
LYMPHOCYTES # BLD AUTO: 1.48 K/UL (ref 1–4.8)
LYMPHOCYTES NFR BLD: 21.7 % (ref 22–41)
MCH RBC QN AUTO: 29.4 PG (ref 27–33)
MCHC RBC AUTO-ENTMCNC: 34.2 G/DL (ref 32.3–36.5)
MCV RBC AUTO: 85.9 FL (ref 81.4–97.8)
MONOCYTES # BLD AUTO: 0.66 K/UL (ref 0–0.85)
MONOCYTES NFR BLD AUTO: 9.7 % (ref 0–13.4)
NEUTROPHILS # BLD AUTO: 4.36 K/UL (ref 1.82–7.42)
NEUTROPHILS NFR BLD: 63.9 % (ref 44–72)
NRBC # BLD AUTO: 0 K/UL
NRBC BLD-RTO: 0 /100 WBC (ref 0–0.2)
PLATELET # BLD AUTO: 156 K/UL (ref 164–446)
PMV BLD AUTO: 10.1 FL (ref 9–12.9)
POTASSIUM SERPL-SCNC: 4.4 MMOL/L (ref 3.6–5.5)
PROT SERPL-MCNC: 6.8 G/DL (ref 6–8.2)
RBC # BLD AUTO: 5.45 M/UL (ref 4.7–6.1)
SODIUM SERPL-SCNC: 143 MMOL/L (ref 135–145)
WBC # BLD AUTO: 6.8 K/UL (ref 4.8–10.8)

## 2025-07-21 PROCEDURE — 99285 EMERGENCY DEPT VISIT HI MDM: CPT

## 2025-07-21 PROCEDURE — A9579 GAD-BASE MR CONTRAST NOS,1ML: HCPCS | Mod: JZ | Performed by: INTERNAL MEDICINE

## 2025-07-21 PROCEDURE — G0378 HOSPITAL OBSERVATION PER HR: HCPCS

## 2025-07-21 PROCEDURE — 36415 COLL VENOUS BLD VENIPUNCTURE: CPT

## 2025-07-21 PROCEDURE — 70553 MRI BRAIN STEM W/O & W/DYE: CPT

## 2025-07-21 PROCEDURE — 700117 HCHG RX CONTRAST REV CODE 255: Mod: JZ | Performed by: INTERNAL MEDICINE

## 2025-07-21 PROCEDURE — 70544 MR ANGIOGRAPHY HEAD W/O DYE: CPT

## 2025-07-21 PROCEDURE — 80053 COMPREHEN METABOLIC PANEL: CPT

## 2025-07-21 PROCEDURE — 85025 COMPLETE CBC W/AUTO DIFF WBC: CPT

## 2025-07-21 PROCEDURE — 70543 MRI ORBT/FAC/NCK W/O &W/DYE: CPT

## 2025-07-21 PROCEDURE — 70450 CT HEAD/BRAIN W/O DYE: CPT

## 2025-07-21 RX ORDER — ONDANSETRON 2 MG/ML
4 INJECTION INTRAMUSCULAR; INTRAVENOUS EVERY 4 HOURS PRN
Status: DISCONTINUED | OUTPATIENT
Start: 2025-07-21 | End: 2025-07-22 | Stop reason: HOSPADM

## 2025-07-21 RX ORDER — ENOXAPARIN SODIUM 100 MG/ML
40 INJECTION SUBCUTANEOUS DAILY
Status: DISCONTINUED | OUTPATIENT
Start: 2025-07-21 | End: 2025-07-22

## 2025-07-21 RX ORDER — ONDANSETRON 4 MG/1
4 TABLET, ORALLY DISINTEGRATING ORAL EVERY 4 HOURS PRN
Status: DISCONTINUED | OUTPATIENT
Start: 2025-07-21 | End: 2025-07-22 | Stop reason: HOSPADM

## 2025-07-21 RX ORDER — GADOTERIDOL 279.3 MG/ML
20 INJECTION INTRAVENOUS ONCE
Status: COMPLETED | OUTPATIENT
Start: 2025-07-21 | End: 2025-07-21

## 2025-07-21 RX ORDER — LABETALOL HYDROCHLORIDE 5 MG/ML
10 INJECTION, SOLUTION INTRAVENOUS EVERY 4 HOURS PRN
Status: DISCONTINUED | OUTPATIENT
Start: 2025-07-21 | End: 2025-07-22 | Stop reason: HOSPADM

## 2025-07-21 RX ORDER — ACETAMINOPHEN 325 MG/1
650 TABLET ORAL EVERY 6 HOURS PRN
Status: DISCONTINUED | OUTPATIENT
Start: 2025-07-21 | End: 2025-07-22 | Stop reason: HOSPADM

## 2025-07-21 RX ORDER — AMOXICILLIN 250 MG
2 CAPSULE ORAL EVERY EVENING
Status: DISCONTINUED | OUTPATIENT
Start: 2025-07-21 | End: 2025-07-22 | Stop reason: HOSPADM

## 2025-07-21 RX ORDER — POLYETHYLENE GLYCOL 3350 17 G/17G
1 POWDER, FOR SOLUTION ORAL
Status: DISCONTINUED | OUTPATIENT
Start: 2025-07-21 | End: 2025-07-22 | Stop reason: HOSPADM

## 2025-07-21 RX ADMIN — GADOTERIDOL 20 ML: 279.3 INJECTION, SOLUTION INTRAVENOUS at 16:53

## 2025-07-21 SDOH — ECONOMIC STABILITY: TRANSPORTATION INSECURITY
IN THE PAST 12 MONTHS, HAS LACK OF RELIABLE TRANSPORTATION KEPT YOU FROM MEDICAL APPOINTMENTS, MEETINGS, WORK OR FROM GETTING THINGS NEEDED FOR DAILY LIVING?: NO

## 2025-07-21 SDOH — ECONOMIC STABILITY: TRANSPORTATION INSECURITY
IN THE PAST 12 MONTHS, HAS THE LACK OF TRANSPORTATION KEPT YOU FROM MEDICAL APPOINTMENTS OR FROM GETTING MEDICATIONS?: NO

## 2025-07-21 ASSESSMENT — SOCIAL DETERMINANTS OF HEALTH (SDOH)
WITHIN THE LAST YEAR, HAVE YOU BEEN KICKED, HIT, SLAPPED, OR OTHERWISE PHYSICALLY HURT BY YOUR PARTNER OR EX-PARTNER?: PATIENT DECLINED
WITHIN THE PAST 12 MONTHS, YOU WORRIED THAT YOUR FOOD WOULD RUN OUT BEFORE YOU GOT THE MONEY TO BUY MORE: PATIENT DECLINED
WITHIN THE LAST YEAR, HAVE TO BEEN RAPED OR FORCED TO HAVE ANY KIND OF SEXUAL ACTIVITY BY YOUR PARTNER OR EX-PARTNER?: PATIENT DECLINED
IN THE PAST 12 MONTHS, HAS THE ELECTRIC, GAS, OIL, OR WATER COMPANY THREATENED TO SHUT OFF SERVICE IN YOUR HOME?: PATIENT DECLINED
WITHIN THE LAST YEAR, HAVE YOU BEEN HUMILIATED OR EMOTIONALLY ABUSED IN OTHER WAYS BY YOUR PARTNER OR EX-PARTNER?: PATIENT DECLINED
WITHIN THE LAST YEAR, HAVE YOU BEEN AFRAID OF YOUR PARTNER OR EX-PARTNER?: PATIENT DECLINED
WITHIN THE PAST 12 MONTHS, THE FOOD YOU BOUGHT JUST DIDN'T LAST AND YOU DIDN'T HAVE MONEY TO GET MORE: PATIENT DECLINED

## 2025-07-21 ASSESSMENT — COGNITIVE AND FUNCTIONAL STATUS - GENERAL
SUGGESTED CMS G CODE MODIFIER MOBILITY: CH
SUGGESTED CMS G CODE MODIFIER DAILY ACTIVITY: CH
DAILY ACTIVITIY SCORE: 24
MOBILITY SCORE: 24

## 2025-07-21 ASSESSMENT — ENCOUNTER SYMPTOMS
HEADACHES: 0
SENSORY CHANGE: 0
FOCAL WEAKNESS: 0

## 2025-07-21 ASSESSMENT — LIFESTYLE VARIABLES
HAVE PEOPLE ANNOYED YOU BY CRITICIZING YOUR DRINKING: NO
EVER HAD A DRINK FIRST THING IN THE MORNING TO STEADY YOUR NERVES TO GET RID OF A HANGOVER: NO
CONSUMPTION TOTAL: NEGATIVE
AVERAGE NUMBER OF DAYS PER WEEK YOU HAVE A DRINK CONTAINING ALCOHOL: 0
TOTAL SCORE: 0
HAVE YOU EVER FELT YOU SHOULD CUT DOWN ON YOUR DRINKING: NO
ALCOHOL_USE: NO
DO YOU DRINK ALCOHOL: NO
TOTAL SCORE: 0
TOTAL SCORE: 0
EVER FELT BAD OR GUILTY ABOUT YOUR DRINKING: NO
HOW MANY TIMES IN THE PAST YEAR HAVE YOU HAD 5 OR MORE DRINKS IN A DAY: 0
ON A TYPICAL DAY WHEN YOU DRINK ALCOHOL HOW MANY DRINKS DO YOU HAVE: 0

## 2025-07-21 ASSESSMENT — PATIENT HEALTH QUESTIONNAIRE - PHQ9
1. LITTLE INTEREST OR PLEASURE IN DOING THINGS: NOT AT ALL
SUM OF ALL RESPONSES TO PHQ9 QUESTIONS 1 AND 2: 0
2. FEELING DOWN, DEPRESSED, IRRITABLE, OR HOPELESS: NOT AT ALL

## 2025-07-21 ASSESSMENT — COPD QUESTIONNAIRES
DO YOU EVER COUGH UP ANY MUCUS OR PHLEGM?: NO/ONLY WITH OCCASIONAL COLDS OR INFECTIONS
HAVE YOU SMOKED AT LEAST 100 CIGARETTES IN YOUR ENTIRE LIFE: NO/DON'T KNOW
DURING THE PAST 4 WEEKS HOW MUCH DID YOU FEEL SHORT OF BREATH: NONE/LITTLE OF THE TIME
COPD SCREENING SCORE: 2

## 2025-07-21 ASSESSMENT — FIBROSIS 4 INDEX: FIB4 SCORE: 1.7

## 2025-07-21 NOTE — ED NOTES
Med rec is complete per Patient at bedside.  Family/friend/caregiver present at time of interview with permission from Patient.     Allergies reviewed.    Has patient had any outside antibiotics in the last 30 days? N    Antibiotics: nitrofurantoin, doxycycline, sulfamethoxazole-trimethoprim?N    Antiparasitics include: albendazole, ivermectin, pyrantel pamoate (OTC), mebendazole and metronidazole?N    Antivirals: acyclovir, valacyclovir?N    Antifungals: voriconazole, posaconazole, and isavuconazonium (Cresemba®)?N       Any Anticoagulants (rivaroxaban, apixaban, edoxaban, dabigatran, warfarin, enoxaparin) taken in the last 14 days? N         Pharmacy/Pharmacies Pt utilizes : [facility]

## 2025-07-21 NOTE — DISCHARGE PLANNING
TCN following. HTH/SCP chart review completed. Note pt currently in ED 2' to sent by Ophthalmologist.      Note pt does not have a PCP listed.  Per review, he is ambulatory and does not have need for O2 at this time.  Based on current review, it is anticipated that pt will dc to Home with close OP f/u (either directly from ED or after admission to [facility] if warranted).     If patient does not warrant admission/ inpatient status to [facility] and is unable to functionally discharge home, please reach out to TCN for assist with SCP auth to discharge directly from ED to [facility].     If patient admits to [facility], please reach out to TCN via [facility] if post acute transitional care needs are warranted for dc planning. Thank you.

## 2025-07-21 NOTE — ED NOTES
Bedside report received from RAMOS Marshall. Assumed care of patient and he is resting, denies any pain or needs. Bed locked and in lowest position. Call light available and within reach. No oxygen requirements at this time. Appropriate fall precautions in place. Patient A&Ox4, GCS 15. Patient on cardiac monitoring, automatic BP and pulse oximeter. See MAR for medications and infusions. No distress noted.

## 2025-07-21 NOTE — PROGRESS NOTES
4 Eyes Skin Assessment Completed by RAMOS bello and RAMOS Decker.    Skin assessment is primarily focused on high risk bony prominences. Pay special attention to skin beneath and around medical devices, high risk bony prominences, skin to skin areas and areas where the patient lacks sensation to feel pain and areas where the patient previously had breakdown.     Head (Occipital):  WDL   Ears (Under Medical Devices): WDL   Nose (Under Medical Devices): WDL   Mouth:  WDL   Neck: WDL   Breast/Chest:  WDL   Shoulder Blades:  WDL   Spine:   WDL   (R) Arm/Elbow/Hand: WDL   (L) Arm/Elbow/Hand: WDL   Abdomen: WDL   Pannus/Groin:  WDL   Sacrum/Coccyx:   WDL   (R) Ischial Tuberosity (Sit Bones):  WDL   (L) Ischial Tuberosity (Sit Bones):  WDL   (R) Leg:  Scab   (L) Leg:  Scab   (R) Heel:  WDL   (R) Foot/Toe: WDL   (L) Heel: WDL   (L) Foot/Toe:  WDL       DEVICES IN USE:   Respiratory Devices:  NA, patient on room air  Feeding Devices:  N/A   Lines & BP Monitoring Devices:  Peripheral IV, BP cuff, and Pulse ox    Orthopedic Devices:  N/A  Miscellaneous Devices:  N/A    PROTOCOL INTERVENTIONS:   Standard/Trauma Bed:  Already in place  Offloading Dressing - Heel:  Already in place    WOUND PHOTOS:   N/A no wounds identified    WOUND CONSULT:   N/A, no advanced wound care needs identified

## 2025-07-21 NOTE — PROGRESS NOTES
Patient arrived to CDU at this time via gurney from ER with transport. Patient A+Ox4, ambulatory, on room air, VSS. Denies pain. Denies vision changes to left eye. Oriented to unit, orders reviewed and released, awaiting MRIs

## 2025-07-21 NOTE — ED TRIAGE NOTES
"Chief Complaint   Patient presents with    Sent by MD     Pt sent by Ophthalmology for further eval following appointment today       Pt states \" I was just told to come and give you this card and say that I need to be checked\"     BP (!) 208/104   Pulse 64   Temp 36.1 °C (97 °F) (Temporal)   Resp 16   Ht 1.905 m (6' 3\")   Wt (!) 149 kg (329 lb 2.4 oz)   SpO2 95%   BMI 41.14 kg/m²     Pt is alert/oriented and follows commands. Pt speaking in full sentences and responds appropriately to questions. No acute distress noted in triage and respirations are even and unlabored.      Pt placed in lobby and educated on triage process. Pt encouraged to alert staff for any changes in condition.    Denies HA or new visual disturbances  "

## 2025-07-21 NOTE — ED PROVIDER NOTES
"  ER Provider Note    Scribed for Citlaly Graves M.d. by Jose Medeiros. 7/21/2025  11:59 AM    Primary Care Provider: Pcp Pt States None    CHIEF COMPLAINT  Chief Complaint   Patient presents with    Sent by MD     Pt sent by Ophthalmology for further eval following appointment today       LIMITATION TO HISTORY   Select: : None    HPI/ROS  OUTSIDE HISTORIAN(S):  Family present at bedside.     EXTERNAL RECORDS REVIEWED  None noted     Hugh Hickey is a 59 y.o. male who presents to the ED for further evaluation following an ophthalmology appointment today. Patient had a recent cataract surgery 1 month ago. He says for the last few days he has been seeing a \"large black floater\" in \"the bottom\" of his left eye, \"it improves over the day.\" Patient was seen at ophthalmologist today or a follow up appointment and told to come to he ED. Patient denies any other significant medical history. Patient takes no daily medications. Patient arrives with a card for his ophthalmologist and requests we call him.    PAST MEDICAL HISTORY  Past Medical History[1]    SURGICAL HISTORY  Past Surgical History[2]    FAMILY HISTORY  History reviewed. No pertinent family history.    SOCIAL HISTORY   reports that he has never smoked. He has never used smokeless tobacco. He reports current alcohol use. He reports that he does not use drugs.    CURRENT MEDICATIONS  Current Discharge Medication List        CONTINUE these medications which have NOT CHANGED    Details   Probiotic Product (PROBIOTIC DAILY PO) Take 1 Tablet by mouth every day.             ALLERGIES  Patient has no known allergies.    PHYSICAL EXAM  BP (!) 176/92   Pulse 66   Temp 36.1 °C (97 °F) (Temporal)   Resp 16   Ht 1.905 m (6' 3\")   Wt (!) 149 kg (329 lb 2.4 oz)   SpO2 96%   BMI 41.14 kg/m²   Constitutional: Alert in no apparent distress.  HENT: No signs of trauma, Bilateral external ears normal, Nose normal.   Eyes: Pupils are equal and reactive, Conjunctiva " normal, Non-icteric.   Neck: No stridor.   Cardiovascular: Regular rate and rhythm, no murmurs.   Thorax & Lungs: Normal breath sounds, No respiratory distress, No wheezing, No chest tenderness.   Skin: Warm, Dry, No erythema, No rash.   Musculoskeletal:  No major deformities noted.   Neurologic: Alert, moving all extremities without difficulty, no focal deficits.     DIAGNOSTIC STUDIES & PROCEDURES    Labs:   Results for orders placed or performed during the hospital encounter of 07/21/25   CBC WITH DIFFERENTIAL    Collection Time: 07/21/25  1:22 PM   Result Value Ref Range    WBC 6.8 4.8 - 10.8 K/uL    RBC 5.45 4.70 - 6.10 M/uL    Hemoglobin 16.0 14.0 - 18.0 g/dL    Hematocrit 46.8 42.0 - 52.0 %    MCV 85.9 81.4 - 97.8 fL    MCH 29.4 27.0 - 33.0 pg    MCHC 34.2 32.3 - 36.5 g/dL    RDW 39.1 35.9 - 50.0 fL    Platelet Count 156 (L) 164 - 446 K/uL    MPV 10.1 9.0 - 12.9 fL    Neutrophils-Polys 63.90 44.00 - 72.00 %    Lymphocytes 21.70 (L) 22.00 - 41.00 %    Monocytes 9.70 0.00 - 13.40 %    Eosinophils 3.80 0.00 - 6.90 %    Basophils 0.30 0.00 - 1.80 %    Immature Granulocytes 0.60 0.00 - 0.90 %    Nucleated RBC 0.00 0.00 - 0.20 /100 WBC    Neutrophils (Absolute) 4.36 1.82 - 7.42 K/uL    Lymphs (Absolute) 1.48 1.00 - 4.80 K/uL    Monos (Absolute) 0.66 0.00 - 0.85 K/uL    Eos (Absolute) 0.26 0.00 - 0.51 K/uL    Baso (Absolute) 0.02 0.00 - 0.12 K/uL    Immature Granulocytes (abs) 0.04 0.00 - 0.11 K/uL    NRBC (Absolute) 0.00 K/uL   COMP METABOLIC PANEL    Collection Time: 07/21/25  1:22 PM   Result Value Ref Range    Sodium 143 135 - 145 mmol/L    Potassium 4.4 3.6 - 5.5 mmol/L    Chloride 108 96 - 112 mmol/L    Co2 25 20 - 33 mmol/L    Anion Gap 10.0 7.0 - 16.0    Glucose 104 (H) 65 - 99 mg/dL    Bun 19 8 - 22 mg/dL    Creatinine 1.03 0.50 - 1.40 mg/dL    Calcium 9.2 8.5 - 10.5 mg/dL    Correct Calcium 9.0 8.5 - 10.5 mg/dL    AST(SGOT) 23 12 - 45 U/L    ALT(SGPT) 27 2 - 50 U/L    Alkaline Phosphatase 77 30 - 99 U/L     Total Bilirubin 0.4 0.1 - 1.5 mg/dL    Albumin 4.3 3.2 - 4.9 g/dL    Total Protein 6.8 6.0 - 8.2 g/dL    Globulin 2.5 1.9 - 3.5 g/dL    A-G Ratio 1.7 g/dL   ESTIMATED GFR    Collection Time: 07/21/25  1:22 PM   Result Value Ref Range    GFR (CKD-EPI) 83 >60 mL/min/1.73 m 2   CBC without Differential    Collection Time: 07/22/25  2:39 AM   Result Value Ref Range    WBC 7.8 4.8 - 10.8 K/uL    RBC 5.56 4.70 - 6.10 M/uL    Hemoglobin 16.1 14.0 - 18.0 g/dL    Hematocrit 48.4 42.0 - 52.0 %    MCV 87.1 81.4 - 97.8 fL    MCH 29.0 27.0 - 33.0 pg    MCHC 33.3 32.3 - 36.5 g/dL    RDW 39.8 35.9 - 50.0 fL    Platelet Count 163 (L) 164 - 446 K/uL    MPV 10.2 9.0 - 12.9 fL   Basic Metabolic Panel (BMP)    Collection Time: 07/22/25  2:39 AM   Result Value Ref Range    Sodium 140 135 - 145 mmol/L    Potassium 4.1 3.6 - 5.5 mmol/L    Chloride 106 96 - 112 mmol/L    Co2 22 20 - 33 mmol/L    Glucose 100 (H) 65 - 99 mg/dL    Bun 16 8 - 22 mg/dL    Creatinine 1.02 0.50 - 1.40 mg/dL    Calcium 8.8 8.5 - 10.5 mg/dL    Anion Gap 12.0 7.0 - 16.0   ESTIMATED GFR    Collection Time: 07/22/25  2:39 AM   Result Value Ref Range    GFR (CKD-EPI) 84 >60 mL/min/1.73 m 2   Prothrombin Time    Collection Time: 07/22/25  8:49 AM   Result Value Ref Range    PT 14.3 12.0 - 14.6 sec    INR 1.11 0.87 - 1.13   APTT    Collection Time: 07/22/25  8:49 AM   Result Value Ref Range    APTT 36.6 (H) 24.7 - 36.0 sec   All labs reviewed by me.    EKG:   I have independently interpreted this EKG as seen above.      COURSE & MEDICAL DECISION MAKING    ED Observation Status? No; Patient does not meet criteria for ED Observation.     11:59 AM - Patient seen and evaluated at bedside. Patient will be treated as ordered for his symptoms. Ordered labs to evaluate. He understands and agrees to the plan of care. Patient's ophthalmologist. Dr Thompson has been called.    12:57 PM I discussed the patient's case and the above findings with Dr. Thompson (ophthalmologist). He saw  optic nerve swelling in superior aspect of left optic nerve, isolated papilledema not typical of optic neuritis more suspicious for pseudotumor cerebri, recommends MRI virgen and orbits, MRV and LP    1:25 PM - Patient was reevaluated at bedside. Discussed available lab and radiology results with the patient and informed them of updated plan of care.     1:42 PM - I discussed the patient's case and the above findings with Dr. Rodas (Hospitalist) who agreed to hospitalize the patient. Patient's care was transferred at this time.     INITIAL ASSESSMENT AND PLAN  Care Narrative: This is a 59-year-old gentleman that presents sent from ophthalmology for concern of localized papilledema.  He was noted to have some optic nerve swelling in the superior portion of his left optic nerve and therefore was sent here for papilledema workup.  Patient will need MRIs and possibly LP and therefore will be hospitalized for this workup.  He is agreeable to this plan.                   DISPOSITION AND DISCUSSIONS  I have discussed management of the patient with the following physicians and ALEYDA's:  Dr. Rodas (Hospitalist), Dr. Thompson (ophthalmologist)      DISPOSITION:  Patient will be hospitalized by Dr. Rodas in guarded condition.     FINAL IMPRESSION   1. Papilledema of left eye        Jose DUNCAN (Mary), am scribing for, and in the presence of, Citlaly Graves M.D..    Electronically signed by: Jose Medeiros (Mary), 7/21/2025    Citlaly DUNCAN M.D. personally performed the services described in this documentation, as scribed by Jose Medeiros in my presence, and it is both accurate and complete.    The note accurately reflects work and decisions made by me.  Citlaly Graves M.D.  7/22/2025  10:37 AM           [1]   Past Medical History:  Diagnosis Date    Cancer (HCC)     Melanoma Left Cheek 7/2024    Cataract     IOL OU    Disorder of thyroid     H/O Thyroid Goiter    Hiatus hernia syndrome     H/O 1994   [2]   Past  Surgical History:  Procedure Laterality Date    VITRECTOMY POSTERIOR Left 8/30/2024    Procedure: LEFT EYE POSTERIOR VITRECTOMY WITH REMOVAL OF DISLOCATED INTRAOCULAR LENS;  Surgeon: Jose Miguel Long M.D.;  Location: SURGERY SAME DAY [facility];  Service: Ophthalmology    OTHER  07/18/2024    Melanoma Excision Left Cheek    THYROIDECTOMY  2012    Goiter    HERNIA REPAIR Left 1994    CATARACT EXTRACTION WITH IOL Bilateral     2007 OS & 2010 OD

## 2025-07-22 ENCOUNTER — PHARMACY VISIT (OUTPATIENT)
Dept: PHARMACY | Facility: MEDICAL CENTER | Age: 60
End: 2025-07-22
Payer: COMMERCIAL

## 2025-07-22 VITALS
HEIGHT: 75 IN | WEIGHT: 315 LBS | RESPIRATION RATE: 16 BRPM | BODY MASS INDEX: 39.17 KG/M2 | HEART RATE: 69 BPM | SYSTOLIC BLOOD PRESSURE: 146 MMHG | OXYGEN SATURATION: 94 % | DIASTOLIC BLOOD PRESSURE: 94 MMHG | TEMPERATURE: 97.7 F

## 2025-07-22 PROBLEM — G93.2 IDIOPATHIC INTRACRANIAL HYPERTENSION: Status: ACTIVE | Noted: 2025-07-22

## 2025-07-22 LAB
ANION GAP SERPL CALC-SCNC: 12 MMOL/L (ref 7–16)
APTT PPP: 36.6 SEC (ref 24.7–36)
BUN SERPL-MCNC: 16 MG/DL (ref 8–22)
BURR CELLS/RBC NFR CSF MANUAL: 0 %
C GATTII+NEOFOR DNA CSF QL NAA+NON-PROBE: NOT DETECTED
CALCIUM SERPL-MCNC: 8.8 MG/DL (ref 8.5–10.5)
CHLORIDE SERPL-SCNC: 106 MMOL/L (ref 96–112)
CLARITY CSF: CLEAR
CMV DNA CSF QL NAA+NON-PROBE: NOT DETECTED
CO2 SERPL-SCNC: 22 MMOL/L (ref 20–33)
COLOR CSF: COLORLESS
COLOR SPUN CSF: COLORLESS
CREAT SERPL-MCNC: 1.02 MG/DL (ref 0.5–1.4)
CSF COMMENTS 1658: NORMAL
CYTOLOGY REG CYTOL: NORMAL
E COLI K1 DNA CSF QL NAA+NON-PROBE: NOT DETECTED
ERYTHROCYTE [DISTWIDTH] IN BLOOD BY AUTOMATED COUNT: 39.8 FL (ref 35.9–50)
EV RNA CSF QL NAA+NON-PROBE: NOT DETECTED
GFR SERPLBLD CREATININE-BSD FMLA CKD-EPI: 84 ML/MIN/1.73 M 2
GLUCOSE CSF-MCNC: 70 MG/DL (ref 40–80)
GLUCOSE SERPL-MCNC: 100 MG/DL (ref 65–99)
GP B STREP DNA CSF QL NAA+NON-PROBE: NOT DETECTED
GRAM STN SPEC: NORMAL
HAEM INFLU DNA CSF QL NAA+NON-PROBE: NOT DETECTED
HCT VFR BLD AUTO: 48.4 % (ref 42–52)
HGB BLD-MCNC: 16.1 G/DL (ref 14–18)
HHV6 DNA CSF QL NAA+NON-PROBE: NOT DETECTED
HSV1 DNA CSF QL NAA+NON-PROBE: NOT DETECTED
HSV2 DNA CSF QL NAA+NON-PROBE: NOT DETECTED
INR PPP: 1.11 (ref 0.87–1.13)
L MONOCYTOG DNA CSF QL NAA+NON-PROBE: NOT DETECTED
MCH RBC QN AUTO: 29 PG (ref 27–33)
MCHC RBC AUTO-ENTMCNC: 33.3 G/DL (ref 32.3–36.5)
MCV RBC AUTO: 87.1 FL (ref 81.4–97.8)
N MEN DNA CSF QL NAA+NON-PROBE: NOT DETECTED
NUC CELL # CSF: 2 CELLS/UL (ref 0–10)
PARECHOVIRUS A RNA CSF QL NAA+NON-PROBE: NOT DETECTED
PLATELET # BLD AUTO: 163 K/UL (ref 164–446)
PMV BLD AUTO: 10.2 FL (ref 9–12.9)
POTASSIUM SERPL-SCNC: 4.1 MMOL/L (ref 3.6–5.5)
PROT CSF-MCNC: 82 MG/DL (ref 15–45)
PROTHROMBIN TIME: 14.3 SEC (ref 12–14.6)
RBC # BLD AUTO: 5.56 M/UL (ref 4.7–6.1)
RBC # CSF: <1 CELLS/UL
S PNEUM DNA CSF QL NAA+NON-PROBE: NOT DETECTED
SIGNIFICANT IND 70042: NORMAL
SITE SITE: NORMAL
SODIUM SERPL-SCNC: 140 MMOL/L (ref 135–145)
SOURCE SOURCE: NORMAL
SPECIMEN VOL CSF: 29 ML
TUBE # CSF: 4
TUBE # CSF: NORMAL
VZV DNA CSF QL NAA+NON-PROBE: NOT DETECTED
WBC # BLD AUTO: 7.8 K/UL (ref 4.8–10.8)

## 2025-07-22 PROCEDURE — 82042 OTHER SOURCE ALBUMIN QUAN EA: CPT

## 2025-07-22 PROCEDURE — 86052 AQUAPORIN-4 ANTB CBA EACH: CPT

## 2025-07-22 PROCEDURE — 85027 COMPLETE CBC AUTOMATED: CPT

## 2025-07-22 PROCEDURE — 85730 THROMBOPLASTIN TIME PARTIAL: CPT

## 2025-07-22 PROCEDURE — 84157 ASSAY OF PROTEIN OTHER: CPT

## 2025-07-22 PROCEDURE — RXMED WILLOW AMBULATORY MEDICATION CHARGE: Performed by: INTERNAL MEDICINE

## 2025-07-22 PROCEDURE — 87205 SMEAR GRAM STAIN: CPT

## 2025-07-22 PROCEDURE — 99285 EMERGENCY DEPT VISIT HI MDM: CPT

## 2025-07-22 PROCEDURE — 700111 HCHG RX REV CODE 636 W/ 250 OVERRIDE (IP): Performed by: INTERNAL MEDICINE

## 2025-07-22 PROCEDURE — G0378 HOSPITAL OBSERVATION PER HR: HCPCS

## 2025-07-22 PROCEDURE — 87070 CULTURE OTHR SPECIMN AEROBIC: CPT

## 2025-07-22 PROCEDURE — 80048 BASIC METABOLIC PNL TOTAL CA: CPT

## 2025-07-22 PROCEDURE — 96374 THER/PROPH/DIAG INJ IV PUSH: CPT

## 2025-07-22 PROCEDURE — 36415 COLL VENOUS BLD VENIPUNCTURE: CPT

## 2025-07-22 PROCEDURE — 85610 PROTHROMBIN TIME: CPT

## 2025-07-22 PROCEDURE — 86255 FLUORESCENT ANTIBODY SCREEN: CPT

## 2025-07-22 PROCEDURE — 82945 GLUCOSE OTHER FLUID: CPT

## 2025-07-22 PROCEDURE — 700102 HCHG RX REV CODE 250 W/ 637 OVERRIDE(OP)

## 2025-07-22 PROCEDURE — 88108 CYTOPATH CONCENTRATE TECH: CPT | Performed by: PATHOLOGY

## 2025-07-22 PROCEDURE — A9270 NON-COVERED ITEM OR SERVICE: HCPCS

## 2025-07-22 PROCEDURE — 82040 ASSAY OF SERUM ALBUMIN: CPT

## 2025-07-22 PROCEDURE — 62272 THER SPI PNXR DRG CSF: CPT

## 2025-07-22 PROCEDURE — 87483 CNS DNA AMP PROBE TYPE 12-25: CPT

## 2025-07-22 PROCEDURE — 82784 ASSAY IGA/IGD/IGG/IGM EACH: CPT

## 2025-07-22 PROCEDURE — 89051 BODY FLUID CELL COUNT: CPT

## 2025-07-22 PROCEDURE — 88108 CYTOPATH CONCENTRATE TECH: CPT | Mod: 26 | Performed by: PATHOLOGY

## 2025-07-22 RX ORDER — LOSARTAN POTASSIUM 50 MG/1
50 TABLET ORAL DAILY
Qty: 100 TABLET | Refills: 3 | Status: SHIPPED | OUTPATIENT
Start: 2025-07-23 | End: 2026-08-27

## 2025-07-22 RX ORDER — ACETAZOLAMIDE 500 MG/1
500 CAPSULE, EXTENDED RELEASE ORAL 2 TIMES DAILY
Status: DISCONTINUED | OUTPATIENT
Start: 2025-07-22 | End: 2025-07-22 | Stop reason: HOSPADM

## 2025-07-22 RX ORDER — LOSARTAN POTASSIUM 50 MG/1
50 TABLET ORAL
Status: DISCONTINUED | OUTPATIENT
Start: 2025-07-22 | End: 2025-07-22 | Stop reason: HOSPADM

## 2025-07-22 RX ORDER — ACETAZOLAMIDE 500 MG/1
500 CAPSULE, EXTENDED RELEASE ORAL 2 TIMES DAILY
Qty: 60 CAPSULE | Refills: 1 | Status: SHIPPED | OUTPATIENT
Start: 2025-07-22

## 2025-07-22 RX ORDER — LOSARTAN POTASSIUM 50 MG/1
25 TABLET ORAL
Status: DISCONTINUED | OUTPATIENT
Start: 2025-07-22 | End: 2025-07-22

## 2025-07-22 RX ORDER — LOSARTAN POTASSIUM 50 MG/1
25 TABLET ORAL ONCE
Status: DISCONTINUED | OUTPATIENT
Start: 2025-07-22 | End: 2025-07-22

## 2025-07-22 RX ADMIN — ACETAZOLAMIDE EXTENDED-RELEASE 500 MG: 500 CAPSULE ORAL at 18:46

## 2025-07-22 RX ADMIN — LOSARTAN POTASSIUM 50 MG: 50 TABLET, FILM COATED ORAL at 10:42

## 2025-07-22 RX ADMIN — LABETALOL HYDROCHLORIDE 10 MG: 5 INJECTION INTRAVENOUS at 13:11

## 2025-07-22 ASSESSMENT — ENCOUNTER SYMPTOMS
BLURRED VISION: 1
VOMITING: 0
FALLS: 0
SORE THROAT: 0
HEADACHES: 1
COUGH: 0
ABDOMINAL PAIN: 0
FEVER: 0
MYALGIAS: 0
PALPITATIONS: 0
WEAKNESS: 1
DEPRESSION: 0
FLANK PAIN: 0
SHORTNESS OF BREATH: 0
NAUSEA: 0
DOUBLE VISION: 0
CHILLS: 0
DIZZINESS: 0

## 2025-07-22 ASSESSMENT — PATIENT HEALTH QUESTIONNAIRE - PHQ9
1. LITTLE INTEREST OR PLEASURE IN DOING THINGS: NOT AT ALL
2. FEELING DOWN, DEPRESSED, IRRITABLE, OR HOPELESS: NOT AT ALL
SUM OF ALL RESPONSES TO PHQ9 QUESTIONS 1 AND 2: 0

## 2025-07-22 ASSESSMENT — PAIN DESCRIPTION - PAIN TYPE
TYPE: ACUTE PAIN

## 2025-07-22 NOTE — ASSESSMENT & PLAN NOTE
CT head negative for acute process  Case discussed with ophthalmology Dr. Thompson who recommended MRI brain, MRI orbits and MRV  MRI brain/ orbits & MRV  normal  LP completed showing opening pressure of 29  Large volume tap completed with removal of 40cc  Initiate Diamox

## 2025-07-22 NOTE — H&P
Hospital Medicine History & Physical Note    Date of Service  7/21/2025    Primary Care Physician  Pcp Pt States None    Consultants  None    Code Status  Full Code    Chief Complaint  Chief Complaint   Patient presents with    Sent by MD     Pt sent by Ophthalmology for further eval following appointment today         History of Presenting Illness  Hugh Hickey is a 59 y.o. male who presented 7/21/2025 with vision changes.  Patient states he had cataract surgery 3 months ago and follows with ophthalmology.  Yesterday he started noticing a large black floater in the bottom as of left eye visual field.  He was seen at his ophthalmologist office today and underwent a dilated retinal exam which revealed papilledema in his left eye and was instructed to present to the ER.  The patient denies any headache, focal weakness, numbness or speech changes at this time.  He denies any fevers, neck stiffness or back pain.  Denies any bowel or bladder dysfunction.  He denies any previous history of stroke or MI.  He does not smoke or drink alcohol significantly.  He is not on any blood thinners.  In the ER he was noted to be hypertensive with a blood pressure of 208/104.  CT head without contrast interpreted by me was negative for acute intracranial process.  ERP discussed the case with ophthalmology Dr. Thompson who recommended MRI brain, MRI orbits and MRV followed by LP.      I discussed the plan of care with patient, bedside RN, and ERP.    Review of Systems  Review of Systems   Neurological:  Negative for sensory change, focal weakness and headaches.       Past Medical History   has a past medical history of Cancer (HCC), Cataract, Disorder of thyroid, and Hiatus hernia syndrome.    Surgical History   has a past surgical history that includes cataract extraction with iol (Bilateral); hernia repair (Left, 1994); thyroidectomy (2012); other (07/18/2024); and vitrectomy posterior (Left, 8/30/2024).     Family  History  History reviewed. No pertinent family history.     Family history reviewed with patient. There is no family history that is pertinent to the chief complaint.     Social History   reports that he has never smoked. He has never used smokeless tobacco. He reports current alcohol use. He reports that he does not use drugs.    Allergies  Allergies[1]    Medications  Prior to Admission Medications   Prescriptions Last Dose Informant Patient Reported? Taking?   Probiotic Product (PROBIOTIC DAILY PO) 7/19/2025 Patient Yes Yes   Sig: Take 1 Tablet by mouth every day.      Facility-Administered Medications: None       Physical Exam  Temp:  [36.1 °C (97 °F)] 36.1 °C (97 °F)  Pulse:  [62-66] 62  Resp:  [16] 16  BP: (157-208)/() 169/94  SpO2:  [93 %-96 %] 94 %  Blood Pressure: (!) 169/94 (RN notified)   Temperature: 36.1 °C (97 °F)   Pulse: 62   Respiration: 16   Pulse Oximetry: 94 %       Physical Exam  Vitals and nursing note reviewed.   Constitutional:       General: He is not in acute distress.  HENT:      Head: Normocephalic.      Mouth/Throat:      Mouth: Mucous membranes are moist.   Eyes:      Pupils: Pupils are equal, round, and reactive to light.   Cardiovascular:      Rate and Rhythm: Normal rate and regular rhythm.      Pulses: Normal pulses.      Heart sounds: Normal heart sounds.   Pulmonary:      Effort: Pulmonary effort is normal.      Breath sounds: Normal breath sounds.   Abdominal:      Palpations: Abdomen is soft.      Tenderness: There is no abdominal tenderness.   Musculoskeletal:         General: No swelling.      Cervical back: Neck supple.   Skin:     General: Skin is warm.      Coloration: Skin is not jaundiced.   Neurological:      General: No focal deficit present.      Mental Status: He is alert and oriented to person, place, and time.   Psychiatric:         Mood and Affect: Mood normal.         Behavior: Behavior normal.         Laboratory:  Recent Labs     07/21/25  1322   WBC 6.8  "  RBC 5.45   HEMOGLOBIN 16.0   HEMATOCRIT 46.8   MCV 85.9   MCH 29.4   MCHC 34.2   RDW 39.1   PLATELETCT 156*   MPV 10.1     Recent Labs     07/21/25  1322   SODIUM 143   POTASSIUM 4.4   CHLORIDE 108   CO2 25   GLUCOSE 104*   BUN 19   CREATININE 1.03   CALCIUM 9.2     Recent Labs     07/21/25  1322   ALTSGPT 27   ASTSGOT 23   ALKPHOSPHAT 77   TBILIRUBIN 0.4   GLUCOSE 104*         No results for input(s): \"NTPROBNP\" in the last 72 hours.      No results for input(s): \"TROPONINT\" in the last 72 hours.    Imaging:  CT-HEAD W/O   Final Result      No acute intracranial abnormality.                  MR-BRAIN-WITH & W/O    (Results Pending)   MR-ORBITS WITH AND WITHOUT SEQUENCES    (Results Pending)   MR-VENOGRAM (MRV) HEAD    (Results Pending)         Assessment/Plan:  Justification for Admission Status  I anticipate this patient is appropriate for observation status at this time because      Patient will need a Telemetry bed on MEDICAL service .  The need is secondary to .    * Papilledema of left eye- (present on admission)  Assessment & Plan  CT head negative for acute process  Case discussed with ophthalmology Dr. Thompson who recommended MRI brain, MRI orbits and MRV  Imaging is negative will proceed with lumbar puncture for evaluation of intracranial hypertension    Obesity  Assessment & Plan  Body mass index is 41.42 kg/m².  Recommended outpatient weight management program       Hypertensive urgency  Assessment & Plan  IV labetalol PRN  Start losartan  Continuous cardiac monitoring        VTE prophylaxis: enoxaparin ppx       [1] No Known Allergies    "

## 2025-07-22 NOTE — HOSPITAL COURSE
Hugh Hickey is a 59 y.o. male with a past medical history of cancer and hypertension who presented 7/21/2025 with vision changes.      Patient states he had cataract surgery 3 months ago and follows with ophthalmology.  Yesterday he started noticing a large black floater in the bottom as of left eye visual field.  He was seen at his ophthalmologist office today and underwent a dilated retinal exam which revealed papilledema in his left eye and was instructed to present to the ER.  The patient denies any headache, focal weakness, numbness or speech changes at this time.  He denies any fevers, neck stiffness or back pain.  Denies any bowel or bladder dysfunction.  He denies any previous history of stroke or MI.  He does not smoke or drink alcohol significantly.  He is not on any blood thinners.      In the ER he was noted to be hypertensive with a blood pressure of 208/104. Labs stable. CT head without contrast interpreted by me was negative for acute intracranial process.  ERP discussed the case with ophthalmology Dr. Thompson who recommended MRI brain, MRI orbits and MRV followed by LP.     Patient seen and examined this a.m.  This with the patient the results of the MRIs with recommendation of LP.  Patient is agreeable at this time.  Patient did endorse having a chronic headache with progressive worsening of his upper extremities.  He does endorse that approximately 12 years ago he did have an LP with large-volume removal.  Prior to the LP he was having significant weakness requiring a walker.  After the large-volume LP was completed he never needed a walker and his symptoms had resolved.  LP was completed today with large volume removal due to an opening pressure of 29.  Patient immediately had resolution of headache with improvement of his upper extremity strength.

## 2025-07-22 NOTE — PROGRESS NOTES
Monitor summary per monitor room interpretation:  Sinus rhythm  Rate 63-78  Rare PVCs  0.19/0.12/0.38

## 2025-07-22 NOTE — PROCEDURES
Lumbar Puncture Procedure    INDICATION: Concerns for intracranial pathology.    PROCEDURE : Rishi Hood D.O.     ATTENDING PHYSICIAN: Rolf Hood M.D.       PROCEDURE SUMMARY:    A time-out was performed. My hands were washed immediately prior to the  procedure. I wore sterile gloves throughout the procedure. The patient was  placed in the right lateral decubitus position. The area was cleansed and draped in usual sterile fashion using full barrier technique.  Anesthesia was achieved with 1% lidocaine. A 20-gauge 3.5-inch spinal  needle was placed in approximately the 2nd-3rd lumbar interspace. On the third attempt, clear  colored cerebral spinal fluid was obtained. The opening pressure was 29 cm H20. 40 mL of CSF was collected into 4 tubes. These were sent for the usual  tests, including 1 tube to be held for further analysis if needed. A sterile bandaid was placed over the puncture site. The patient had no immediate complications and tolerated the procedure well.  Estimated  blood loss was <2 mL.    Time: 30 min    Rishi Hood D.O.   PGY-1  [facility]

## 2025-07-22 NOTE — ASSESSMENT & PLAN NOTE
MRI brain and orbits normal  MRV normal  LP in the lateral lateral recumbent position showing opening pressure of 29  Large volume tap completed  Will initiate Diamox 500mg BID

## 2025-07-22 NOTE — PROGRESS NOTES
General neurology was made aware of this patient.    In short this is a 59-year-old male with a past medical history of cancer (though unclear based on chart review which type or location), hypertension, and obesity with a BMI of 41 who presented to the ED on July 21 on behest of his ophthalmologist for vision changes, floaters and after a dilated exam noted papilledema in his left eye.  Under the guidance of his ophthalmologist Dr. Thompson patient underwent MRI brain, MRI orbit, and MR venogram with and without contrast, all of which were unrevealing for secondary etiologies of suspected primary diagnosis of idiopathic intracranial hypertension.  Of note, patient reports that approximately 12 years ago he received a large-volume lumbar puncture, which improved his symptoms of headache and limb weakness, allegedly of the upper extremities.  I have reviewed the available intracranial imaging.    Given that the MR imaging was unremarkable patient then underwent a lumbar puncture with opening pressure, which revealed an opening pressure of 29 in the lateral recumbent position.  Relatively large volume of CSF was also removed, approximately 40 cc.  Patient verbalized immediate improvement in his symptoms following large-volume lumbar puncture to the attending performing the lumbar puncture, Dr. Hood.  He noted specifically that his headache had improved.    After discussing this case with ANNAMARIA Snider we formulated a plan that involves initiating patient on Diamox 500 mg twice a day, and follow-up to be arranged with outpatient neurology and outpatient ophthalmology.  Patient should be made aware that Diamox can cause metabolic acidosis and paresthesias especially in the distal extremities.  Hypersensitivity reactions can also be seen including anaphylaxis and urticaria, and patient should represent to the ED if the rash were to develop either immediately after starting Diamox or even days to weeks following  initiation.  Monitoring serology to include CBC with differential and a CMP.    Neurology does not recommend any further inpatient diagnostic studies in light of already extensive and reassuringly normal MR work up. Patient's age and gender are atypical for a diagnosis of IIH, however as previously mentioned, work up for secondary causes has been reassuring. Given that neurology does not recommend any further inpatient diagnostic studies at the time of this consultation, after discussing with primary team, a formal consultation was not requested though we are always available if that were to change. Please feel free to reach back out to us.    Given that CSF was obtained, please send for cell count, glucose, protein, culture, and cytology. Also, if weight loss is an attainable goal for patient, could consider also starting topamax in the future for weight loss and IIH, especially if diamox alone does not control his signs and symptoms.     Signed:  Damian oSlares DO  Department of Neurology  [facility]

## 2025-07-22 NOTE — CARE PLAN
The patient is Stable - Low risk of patient condition declining or worsening    Shift Goals  Clinical Goals: MRI results  Patient Goals: rest, answers  Family Goals: audie    Progress made toward(s) clinical / shift goals:    Problem: Knowledge Deficit - Standard  Goal: Patient and family/care givers will demonstrate understanding of plan of care, disease process/condition, diagnostic tests and medications  Description: Target End Date:  1-3 days or as soon as patient condition allows    Document in Patient Education    1.  Patient and family/caregiver oriented to unit, equipment, visitation policy and means for communicating concern  2.  Complete/review Learning Assessment  3.  Assess knowledge level of disease process/condition, treatment plan, diagnostic tests and medications  4.  Explain disease process/condition, treatment plan, diagnostic tests and medications  Outcome: Progressing, discussed POC with pt; agreeable and verbalized understanding. MRI results returned. No new or worsening symptoms reported.     Patient is not progressing towards the following goals:

## 2025-07-22 NOTE — PROGRESS NOTES
[facility] Medicine Daily Progress Note    Date of Service  7/22/2025    Chief Complaint  Hugh Hickey is a 59 y.o. male admitted 7/21/2025 with vision changes.    Hospital Course  Hugh Hickey is a 59 y.o. male with a past medical history of cancer and hypertension who presented 7/21/2025 with vision changes.      Patient states he had cataract surgery 3 months ago and follows with ophthalmology.  Yesterday he started noticing a large black floater in the bottom as of left eye visual field.  He was seen at his ophthalmologist office today and underwent a dilated retinal exam which revealed papilledema in his left eye and was instructed to present to the ER.  The patient denies any headache, focal weakness, numbness or speech changes at this time.  He denies any fevers, neck stiffness or back pain.  Denies any bowel or bladder dysfunction.  He denies any previous history of stroke or MI.  He does not smoke or drink alcohol significantly.  He is not on any blood thinners.      In the ER he was noted to be hypertensive with a blood pressure of 208/104. Labs stable. CT head without contrast interpreted by me was negative for acute intracranial process.  ERP discussed the case with ophthalmology Dr. Thompson who recommended MRI brain, MRI orbits and MRV followed by LP.     Patient seen and examined this a.m.  This with the patient the results of the MRIs with recommendation of LP.  Patient is agreeable at this time.  Patient did endorse having a chronic headache with progressive worsening of his upper extremities.  He does endorse that approximately 12 years ago he did have an LP with large-volume removal.  Prior to the LP he was having significant weakness requiring a walker.  After the large-volume LP was completed he never needed a walker and his symptoms had resolved.  LP was completed today with large volume removal due to an opening pressure of 29.  Patient immediately had resolution of headache with  improvement of his upper extremity strength.    Interval Problem Update  Large-volume LP was completed with improvement of symptoms with the headache and improvement to his upper extremity strength.  - Plan of care: Monitor patient for leak and refractory headache.  Potentially initiate Diamox for further management.  - Disposition:    I have discussed this patient's plan of care and discharge plan at IDT rounds today with Case Management, Nursing, Nursing leadership, and other members of the IDT team.    Consultants/Specialty  None    Code Status  Full Code    Disposition  The patient is not medically cleared for discharge to home or a post-acute facility.  Anticipate discharge to: home with close outpatient follow-up    I have placed the appropriate orders for post-discharge needs.    Review of Systems  Review of Systems   Constitutional:  Negative for chills, fever and malaise/fatigue.   HENT:  Negative for congestion and sore throat.    Eyes:  Positive for blurred vision (Floaters in the Eye). Negative for double vision.   Respiratory:  Negative for cough and shortness of breath.    Cardiovascular:  Negative for chest pain, palpitations and leg swelling.   Gastrointestinal:  Negative for abdominal pain, nausea and vomiting.   Genitourinary:  Negative for dysuria and flank pain.   Musculoskeletal:  Negative for falls and myalgias.   Skin:  Negative for itching and rash.   Neurological:  Positive for weakness (Upper Extremity) and headaches. Negative for dizziness.   Psychiatric/Behavioral:  Negative for depression and suicidal ideas.         Physical Exam  Temp:  [36.3 °C (97.3 °F)-36.7 °C (98.1 °F)] 36.5 °C (97.7 °F)  Pulse:  [69-83] 80  Resp:  [16-17] 16  BP: (155-179)/() 175/100  SpO2:  [91 %-95 %] 91 %    Physical Exam  Constitutional:       Appearance: Normal appearance.   HENT:      Head: Normocephalic and atraumatic.   Eyes:      Pupils: Pupils are equal, round, and reactive to light.    Cardiovascular:      Rate and Rhythm: Normal rate and regular rhythm.      Pulses: Normal pulses.      Heart sounds: Normal heart sounds.   Pulmonary:      Effort: Pulmonary effort is normal.      Breath sounds: Normal breath sounds.   Abdominal:      General: Bowel sounds are normal.      Palpations: Abdomen is soft.   Musculoskeletal:         General: Normal range of motion.   Skin:     General: Skin is warm and dry.   Neurological:      General: No focal deficit present.      Mental Status: He is alert. Mental status is at baseline.   Psychiatric:         Mood and Affect: Mood normal.         Behavior: Behavior normal.         Fluids  No intake or output data in the 24 hours ending 07/22/25 1549     Laboratory  Recent Labs     07/21/25  1322 07/22/25  0239   WBC 6.8 7.8   RBC 5.45 5.56   HEMOGLOBIN 16.0 16.1   HEMATOCRIT 46.8 48.4   MCV 85.9 87.1   MCH 29.4 29.0   MCHC 34.2 33.3   RDW 39.1 39.8   PLATELETCT 156* 163*   MPV 10.1 10.2     Recent Labs     07/21/25  1322 07/22/25  0239   SODIUM 143 140   POTASSIUM 4.4 4.1   CHLORIDE 108 106   CO2 25 22   GLUCOSE 104* 100*   BUN 19 16   CREATININE 1.03 1.02   CALCIUM 9.2 8.8     Recent Labs     07/22/25  0849   APTT 36.6*   INR 1.11               Imaging  MR-VENOGRAM (MRV) HEAD   Final Result      There is no evidence of venous sinus thrombosis or stenosis.      MR-ORBITS WITH AND WITHOUT SEQUENCES   Final Result      MRI of the orbits without and with contrast within normal limits.      MR-BRAIN-WITH & W/O   Final Result      Unremarkable pre and postcontrast MR examination of the brain.      CT-HEAD W/O   Final Result      No acute intracranial abnormality.                       Assessment/Plan  * Papilledema of left eye- (present on admission)  Assessment & Plan  CT head negative for acute process  Case discussed with ophthalmology Dr. Thompson who recommended MRI brain, MRI orbits and MRV  MRI brain/ orbits & MRV  normal  LP completed showing opening pressure of  29  Large volume tap completed with removal of 40cc  Initiate Diamox    Idiopathic intracranial hypertension  Assessment & Plan  MRI brain and orbits normal  MRV normal  LP in the lateral lateral recumbent position showing opening pressure of 29  Large volume tap completed  Will initiate Diamox 500mg BID    Obesity  Assessment & Plan  Body mass index is 41.42 kg/m².  Recommended outpatient weight management program     Hypertensive urgency  Assessment & Plan  IV labetalol PRN  Start losartan  Continuous cardiac monitoring       VTE prophylaxis:   SCDs/TEDs      I have performed a physical exam and reviewed and updated ROS and Plan today (7/22/2025). In review of yesterday's note (7/21/2025), there are no changes except as documented above.      I, CHAPO Snider performed a substantiated portion of the service face-to-face with same patient on the same date of service INDEPENDENTLY FROM THE MD ON ASSESSMENT, EXAMINATION, AND DISCUSSION IN PLAN OF CARE FOR 20 MINUTES.  I was personally involved in reviewing and conducting the medical decision making, including the information as described in this note.

## 2025-07-23 NOTE — DISCHARGE SUMMARY
Discharge Summary    CHIEF COMPLAINT ON ADMISSION  Chief Complaint   Patient presents with    Sent by MD     Pt sent by Ophthalmology for further eval following appointment today         Reason for Admission  Sent by MD     Admission Date  7/21/2025    CODE STATUS  Full Code    HPI & HOSPITAL COURSE  Hugh Hickey is a 59 y.o. male with a past medical history of cancer and hypertension who presented 7/21/2025 with vision changes.      Patient states he had cataract surgery 3 months ago and follows with ophthalmology.  Yesterday he started noticing a large black floater in the bottom as of left eye visual field.  He was seen at his ophthalmologist office today and underwent a dilated retinal exam which revealed papilledema in his left eye and was instructed to present to the ER.  The patient denies any headache, focal weakness, numbness or speech changes at this time.  He denies any fevers, neck stiffness or back pain.  Denies any bowel or bladder dysfunction.  He denies any previous history of stroke or MI.  He does not smoke or drink alcohol significantly.  He is not on any blood thinners.      In the ER he was noted to be hypertensive with a blood pressure of 208/104. Labs stable. CT head without contrast interpreted by me was negative for acute intracranial process.  ERP discussed the case with ophthalmology Dr. Thompson who recommended MRI brain, MRI orbits and MRV followed by LP.     Patient seen and examined this a.m.  This with the patient the results of the MRIs with recommendation of LP.  Patient is agreeable at this time.  Patient did endorse having a chronic headache with progressive worsening of his upper extremities.  He does endorse that approximately 12 years ago he did have an LP with large-volume removal.  Prior to the LP he was having significant weakness requiring a walker.  After the large-volume LP was completed he never needed a walker and his symptoms had resolved.  LP was completed today  with large volume removal due to an opening pressure of 29.  Patient immediately had resolution of headache with improvement of his upper extremity strength.    Patient is initial CSF fluid showed an elevated total protein of 82 but otherwise was bland.  CSF cultures 24 hours out are no growth to date.  His meningitis encephalitis panel was negative.  We spoke in detail with neurology service and started the patient on low-dose Diamox and losartan.  He was given extensive education about possible side effects of Diamox including paresthesias.  I also ordered an extensive array of studies on CSF including IgG index, paraneoplastic antibody panel, cytology, aqua porn for IgG antibody.  Patient was given an urgent neurology outpatient referral.  As noted above all his MRIs were completely normal and therefore asymmetrical papilledema in the setting of presumed idiopathic intracranial hypertension is likely secondary to underlying subtle anatomical variants with possible reduced shunting on the left side.  His symptoms improved and he had no severe post LP headache.  He was deemed stable for discharge home.  He was also encouraged to get outpatient nutritional counseling as there is strong evidence showing that weight loss even of only 6 to 10 kg can significantly reduce the risk of this condition.  He understood this.    Therefore, he is discharged in good and stable condition to home with close outpatient follow-up.    Discharge Date  7/22/2025    FOLLOW UP ITEMS POST DISCHARGE  Establish care with [facility], urgent referral placed.  Seek outpatient care with nutrition counseling for weight loss.  Follow-up with his primary care doctor in 1 to 2 weeks for longitudinal follow-up    DISCHARGE DIAGNOSES  Principal Problem:    Papilledema of left eye (POA: Yes)  Active Problems:    Hypertensive urgency (POA: Unknown)    Obesity (POA: Unknown)    Idiopathic intracranial hypertension (POA: Unknown)  Resolved  Problems:    * No resolved hospital problems. *      FOLLOW UP  No future appointments.  No follow-up provider specified.    MEDICATIONS ON DISCHARGE     Medication List        START taking these medications        Instructions   acetaZOLAMIDE  MG Cp12  Commonly known as: Diamox   Take 1 Capsule by mouth 2 times a day.  Dose: 500 mg     losartan 50 MG Tabs  Start taking on: July 23, 2025  Commonly known as: Cozaar   Take 1 Tablet by mouth every day.  Dose: 50 mg            CONTINUE taking these medications        Instructions   PROBIOTIC DAILY PO   Take 1 Tablet by mouth every day.  Dose: 1 Tablet              Allergies  Allergies[1]    DIET  Orders Placed This Encounter   Procedures    Diet Order Diet: Regular     Standing Status:   Standing     Number of Occurrences:   1     Diet::   Regular [1]       ACTIVITY  As tolerated.  Weight bearing as tolerated    CONSULTATIONS  neurology    PROCEDURES  LP    MR-VENOGRAM (MRV) HEAD   Final Result      There is no evidence of venous sinus thrombosis or stenosis.      MR-ORBITS WITH AND WITHOUT SEQUENCES   Final Result      MRI of the orbits without and with contrast within normal limits.      MR-BRAIN-WITH & W/O   Final Result      Unremarkable pre and postcontrast MR examination of the brain.      CT-HEAD W/O   Final Result      No acute intracranial abnormality.                        LABORATORY  Lab Results   Component Value Date    SODIUM 140 07/22/2025    POTASSIUM 4.1 07/22/2025    CHLORIDE 106 07/22/2025    CO2 22 07/22/2025    GLUCOSE 100 (H) 07/22/2025    BUN 16 07/22/2025    CREATININE 1.02 07/22/2025        Lab Results   Component Value Date    WBC 7.8 07/22/2025    HEMOGLOBIN 16.1 07/22/2025    HEMATOCRIT 48.4 07/22/2025    PLATELETCT 163 (L) 07/22/2025        Total time of the discharge process exceeds 43 minutes.         [1] No Known Allergies

## 2025-07-23 NOTE — PROGRESS NOTES
DISCHARGE NOTE    Patient cleared for discharge home no services by MD [facility]. IV removed. All discharge instructions reviewed with patient and his wife at bedside, he signed his discharge packet, all questions answered and meds to beds sent to pharmacy. Patient left at this time with his wife, took all personal belongings with him.

## 2025-07-23 NOTE — DISCHARGE INSTRUCTIONS
We placed a referral to neurology.    Lab will call you with results of your CSF fluid workup    Please establish primary care

## 2025-07-25 LAB
ALB CSF/SERPL: 8.9 RATIO (ref 0–9)
ALBUMIN CSF-MCNC: 37 MG/DL (ref 0–35)
ALBUMIN SERPL-MCNC: 4163 MG/DL (ref 3500–5200)
BACTERIA CSF CULT: NORMAL
GRAM STN SPEC: NORMAL
IGG CSF-MCNC: 5.5 MG/DL (ref 0–6)
IGG SERPL-MCNC: 1052 MG/DL (ref 768–1632)
IGG SYNTH RATE SER+CSF CALC-MRATE: 3 MG/D
IGG/ALB CLEAR SER+CSF-RTO: 0.59 RATIO (ref 0.28–0.66)
IGG/ALB CSF: 0.15 RATIO (ref 0.09–0.25)
SIGNIFICANT IND 70042: NORMAL
SITE SITE: NORMAL
SOURCE SOURCE: NORMAL

## 2025-07-29 LAB — AQP4 H2O CHANNEL IGG CSF QL: NORMAL

## 2025-08-06 LAB — TEST NAME 95000: NORMAL

## (undated) DEVICE — LACTATED RINGERS INJ 1000 ML - (14EA/CA 60CA/PF)

## (undated) DEVICE — SUTURE 7-0 VICRYL TG140-8 (12PK/BX)

## (undated) DEVICE — WATER IRRIGATION STERILE 1000ML (12EA/CA)

## (undated) DEVICE — CANISTER SUCTION RIGID RED 1500CC (40EA/CA)

## (undated) DEVICE — GLOVE SZ 6 BIOGEL PI MICRO - PF LF (50PR/BX 4BX/CA)

## (undated) DEVICE — SENSOR OXIMETER ADULT SPO2 RD SET (20EA/BX)

## (undated) DEVICE — SODIUM CHL IRRIGATION 0.9% 1000ML (12EA/CA)

## (undated) DEVICE — GOWN WARMING STANDARD FLEX - (30/CA)

## (undated) DEVICE — Device

## (undated) DEVICE — GOWN SURGEONS LARGE - (32/CA)

## (undated) DEVICE — SUTURE 6-0 PLAIN GUT G-1 D/A 18 (12PK/BX)"

## (undated) DEVICE — CANNULA O2 COMFORT SOFT EAR ADULT 7 FT TUBING (50/CA)

## (undated) DEVICE — CANNULA INJECTION 27G (EYE) - 10/BX ALCON

## (undated) DEVICE — SET LEADWIRE 5 LEAD BEDSIDE DISPOSABLE ECG (1SET OF 5/EA)

## (undated) DEVICE — MASK OXYGEN VNYL ADLT MED CONC WITH 7 FOOT TUBING - (50EA/CA)

## (undated) DEVICE — TUBING CLEARLINK DUO-VENT - C-FLO (48EA/CA)

## (undated) DEVICE — KIT  I.V. START (100EA/CA)

## (undated) DEVICE — NEEDLE FILTER ASPIRATION 18 GA X 1 1/2 IN (100EA/BX)

## (undated) DEVICE — GLOVE SZ 7 BIOGEL PI MICRO - PF LF (50PR/BX 4BX/CA)

## (undated) DEVICE — SUCTION INSTRUMENT YANKAUER BULBOUS TIP W/O VENT (50EA/CA)

## (undated) DEVICE — GLOVE BIOGEL INDICATOR SZ 8.5 SURGICAL PF LTX - (50/BX 4BX/CA)

## (undated) DEVICE — TOWEL STOP TIMEOUT SAFETY FLAG (40EA/CA)

## (undated) DEVICE — GOWN SURGEONS X-LARGE - DISP. (30/CA)

## (undated) DEVICE — TIP POLYMER I&A

## (undated) DEVICE — PACK DUO VISC LARGE

## (undated) DEVICE — TIP NEEDLE SOFT 25G X 0.8MM (10EA/BX)

## (undated) DEVICE — KNIFE 2.75MM ANGL SNGL BEV/SAFETY (10/CA 10/SP)

## (undated) DEVICE — SUTURE GENERAL

## (undated) DEVICE — GLOVE BIOGEL SZ 8.5 SURGICAL PF LTX - (50PR/BX 4BX/CA)

## (undated) DEVICE — PACK VITRECTOMY 25G 20K V W (1EA/BX)

## (undated) DEVICE — GLOVE BIOGEL SZ 6.5 SURGICAL PF LTX (50PR/BX 4BX/CA)

## (undated) DEVICE — RETRACTOR IRIS FLEX (6EA/BX)

## (undated) DEVICE — PACK VITRECTOMY (1EA/CA)

## (undated) DEVICE — TUBE CONNECTING SUCTION - CLEAR PLASTIC STERILE 72 IN (50EA/CA)

## (undated) DEVICE — SLEEVE VASO DVT COMPRESSION CALF MED - (10PR/CA)

## (undated) DEVICE — CANISTER SUCTION 3000ML MECHANICAL FILTER AUTO SHUTOFF MEDI-VAC NONSTERILE LF DISP (40EA/CA)

## (undated) DEVICE — KNIFE MICROSURGICAL 15 DEGREE GREEN

## (undated) DEVICE — DRESSING TRANSPARENT FILM TEGADERM 2.375 X 2.75" (100EA/BX)"

## (undated) DEVICE — ELECTRODE DUAL RETURN W/ CORD - (50/PK)

## (undated) DEVICE — SYRINGE NON SAFETY 3 CC 21 GA X 1 1/2 IN (100/BX 8BX/CA)

## (undated) DEVICE — SPEAR EYE SPNG 3ANG MLBL HNDL - (10/ST18ST/PK 180/PK 1PK/SP)